# Patient Record
Sex: MALE | Race: WHITE | NOT HISPANIC OR LATINO | Employment: UNEMPLOYED | URBAN - METROPOLITAN AREA
[De-identification: names, ages, dates, MRNs, and addresses within clinical notes are randomized per-mention and may not be internally consistent; named-entity substitution may affect disease eponyms.]

---

## 2023-03-21 ENCOUNTER — LAB (OUTPATIENT)
Dept: LAB | Facility: HOSPITAL | Age: 58
End: 2023-03-21
Payer: MEDICAID

## 2023-03-21 ENCOUNTER — TELEPHONE (OUTPATIENT)
Dept: FAMILY MEDICINE CLINIC | Facility: CLINIC | Age: 58
End: 2023-03-21

## 2023-03-21 ENCOUNTER — OFFICE VISIT (OUTPATIENT)
Dept: FAMILY MEDICINE CLINIC | Facility: CLINIC | Age: 58
End: 2023-03-21
Payer: MEDICAID

## 2023-03-21 VITALS
SYSTOLIC BLOOD PRESSURE: 124 MMHG | BODY MASS INDEX: 31.04 KG/M2 | WEIGHT: 197.8 LBS | OXYGEN SATURATION: 99 % | DIASTOLIC BLOOD PRESSURE: 82 MMHG | HEIGHT: 67 IN | HEART RATE: 81 BPM

## 2023-03-21 DIAGNOSIS — I10 PRIMARY HYPERTENSION: ICD-10-CM

## 2023-03-21 DIAGNOSIS — I87.2 VENOUS INSUFFICIENCY OF BOTH LOWER EXTREMITIES: ICD-10-CM

## 2023-03-21 DIAGNOSIS — Z00.00 ENCOUNTER FOR MEDICAL EXAMINATION TO ESTABLISH CARE: ICD-10-CM

## 2023-03-21 DIAGNOSIS — E11.65 TYPE 2 DIABETES MELLITUS WITH HYPERGLYCEMIA, WITHOUT LONG-TERM CURRENT USE OF INSULIN: Primary | ICD-10-CM

## 2023-03-21 DIAGNOSIS — J45.20 MILD INTERMITTENT ASTHMA WITHOUT COMPLICATION: ICD-10-CM

## 2023-03-21 DIAGNOSIS — Z12.11 COLON CANCER SCREENING: ICD-10-CM

## 2023-03-21 DIAGNOSIS — E78.5 HYPERLIPIDEMIA LDL GOAL <70: ICD-10-CM

## 2023-03-21 DIAGNOSIS — J30.2 SEASONAL ALLERGIES: ICD-10-CM

## 2023-03-21 DIAGNOSIS — Z87.891 FORMER TOBACCO USE: ICD-10-CM

## 2023-03-21 DIAGNOSIS — Z79.82 CURRENT USE OF ASPIRIN: ICD-10-CM

## 2023-03-21 LAB
CHOLEST SERPL-MCNC: 229 MG/DL (ref 0–200)
CHOLEST/HDLC SERPL: 3.18 {RATIO}
HDLC SERPL-MCNC: 72 MG/DL (ref 40–60)
LDLC SERPL CALC-MCNC: 128 MG/DL (ref 0–100)
TRIGL SERPL-MCNC: 164 MG/DL (ref 0–150)
VLDLC SERPL CALC-MCNC: 29 MG/DL (ref 5–40)

## 2023-03-21 RX ORDER — ATORVASTATIN CALCIUM 20 MG/1
20 TABLET, FILM COATED ORAL DAILY
Qty: 90 TABLET | Refills: 3 | Status: SHIPPED | OUTPATIENT
Start: 2023-03-21 | End: 2023-03-22 | Stop reason: ALTCHOICE

## 2023-03-21 RX ORDER — LORATADINE 10 MG/1
10 TABLET ORAL DAILY
Qty: 30 TABLET | Refills: 5 | Status: SHIPPED | OUTPATIENT
Start: 2023-03-21

## 2023-03-21 RX ORDER — ASPIRIN 81 MG/1
81 TABLET ORAL DAILY
COMMUNITY
End: 2023-03-21 | Stop reason: SDUPTHER

## 2023-03-21 RX ORDER — FUROSEMIDE 20 MG/1
20 TABLET ORAL 2 TIMES DAILY
COMMUNITY
End: 2023-03-21 | Stop reason: SDUPTHER

## 2023-03-21 RX ORDER — POTASSIUM CITRATE 10 MEQ/1
10 TABLET, EXTENDED RELEASE ORAL
COMMUNITY
End: 2023-03-21 | Stop reason: SDUPTHER

## 2023-03-21 RX ORDER — ATORVASTATIN CALCIUM 20 MG/1
20 TABLET, FILM COATED ORAL DAILY
COMMUNITY
End: 2023-03-21 | Stop reason: SDUPTHER

## 2023-03-21 RX ORDER — POTASSIUM CITRATE 10 MEQ/1
10 TABLET, EXTENDED RELEASE ORAL DAILY
Qty: 30 EACH | Refills: 5 | Status: SHIPPED | OUTPATIENT
Start: 2023-03-21

## 2023-03-21 RX ORDER — ASPIRIN 81 MG/1
81 TABLET ORAL DAILY
Qty: 30 TABLET | Refills: 5 | Status: SHIPPED | OUTPATIENT
Start: 2023-03-21

## 2023-03-21 RX ORDER — LANCETS
1 EACH MISCELLANEOUS DAILY
Qty: 100 EACH | Refills: 11 | Status: SHIPPED | OUTPATIENT
Start: 2023-03-21

## 2023-03-21 RX ORDER — LISINOPRIL 10 MG/1
10 TABLET ORAL DAILY
Qty: 30 TABLET | Refills: 5 | Status: SHIPPED | OUTPATIENT
Start: 2023-03-21

## 2023-03-21 RX ORDER — BLOOD-GLUCOSE METER
1 KIT MISCELLANEOUS DAILY
Qty: 1 EACH | Refills: 0 | Status: SHIPPED | OUTPATIENT
Start: 2023-03-21

## 2023-03-21 RX ORDER — FUROSEMIDE 20 MG/1
20 TABLET ORAL DAILY PRN
Qty: 30 TABLET | Refills: 5 | Status: SHIPPED | OUTPATIENT
Start: 2023-03-21

## 2023-03-21 RX ORDER — LISINOPRIL 10 MG/1
10 TABLET ORAL DAILY
COMMUNITY
End: 2023-03-21 | Stop reason: SDUPTHER

## 2023-03-21 RX ORDER — ALBUTEROL SULFATE 90 UG/1
2 AEROSOL, METERED RESPIRATORY (INHALATION) EVERY 4 HOURS PRN
Qty: 18 G | Refills: 2 | Status: SHIPPED | OUTPATIENT
Start: 2023-03-21

## 2023-03-21 NOTE — TELEPHONE ENCOUNTER
Rx Refill Note  Requested Prescriptions     Signed Prescriptions Disp Refills   • glucose monitor monitoring kit 1 each 0     Si each Daily. Use daily as directed.     Authorizing Provider: HSANNA MANCINI     Ordering User: HAYDEE RAE   • glucose blood test strip 100 each 12     Si each by Other route Daily. Use daily as directed     Authorizing Provider: SHANNA MANCINI     Ordering User: HAYDEE RAE   • Lancets Thin misc 100 each 11     Si each Daily. Use daily as directed     Authorizing Provider: SHANNA MANCINI     Ordering User: HAYDEE RAE      Last office visit with prescribing clinician: 3/21/2023   Last telemedicine visit with prescribing clinician: 2023   Next office visit with prescribing clinician: 2023                         Would you like a call back once the refill request has been completed: [] Yes [] No    If the office needs to give you a call back, can they leave a voicemail: [] Yes [] No    Haydee Rae MA  23, 16:00 EDT

## 2023-03-21 NOTE — PROGRESS NOTES
New Patient Office Visit      Patient Name: Prosper Gonzalez  : 1965   MRN: [id number]   Care Team: Patient Care Team:  Moriah Hamilton DO as PCP - General (Internal Medicine)    Chief Complaint:    Chief Complaint   Patient presents with   • new patient preventative medicine service   • Diabetes       History of Present Illness: Prosper Gonzalez is a 57 y.o. male with HTN, T2DM, HLD who is here today to establish care. Feeling well today. No acute complaints.     T2DM - a1c  6.7% 2023. He is taking metformin 1000mg twice daily. He is on ACEi and statin.     Recently had labs including CMP, CBC, HCV screening, HIV screening which were all unrevealing other than mild hyperglycemia. Has not had cholesterol screening.    History of substance abuse in recovery (alcoholism, marijuana, meth). He has been sober for 4 years. He was recently released from group home and is living in sober living at [facility] sober living [facility]. Happy with this program. Denies cravings.     HTN - he is taking lasix 20mg daily and lisinopril 10mg daily. BP well controlled with these. He has been out of these medications for about 1 week. Lasix has been used to manage his bilateral LE edema. He also uses compression stockings        Subjective      Review of Systems:   Review of Systems - See HPI    Past Medical History:   Past Medical History:   Diagnosis Date   • Asthma    • Depression    • Diabetes mellitus (HCC)    • Hyperlipidemia    • Hypertension    • Kidney stone        Past Surgical History: History reviewed. No pertinent surgical history.    Family History: History reviewed. No pertinent family history.    Social History:   Social History     Socioeconomic History   • Marital status:    Tobacco Use   • Smoking status: Former     Packs/day: 1.00     Years: 34.00     Pack years: 34.00     Types: Cigarettes     Start date: 1973     Quit date: 2007     Years since quittin.2   • Smokeless  "tobacco: Never   Vaping Use   • Vaping Use: Never used   Substance and Sexual Activity   • Alcohol use: Not Currently   • Drug use: Not Currently   • Sexual activity: Defer       Tobacco History:   Social History     Tobacco Use   Smoking Status Former   • Packs/day: 1.00   • Years: 34.00   • Pack years: 34.00   • Types: Cigarettes   • Start date: 1973   • Quit date: 2007   • Years since quittin.2   Smokeless Tobacco Never       Medications:     Current Outpatient Medications:   •  aspirin 81 MG EC tablet, Take 1 tablet by mouth Daily., Disp: 30 tablet, Rfl: 5  •  atorvastatin (LIPITOR) 20 MG tablet, Take 1 tablet by mouth Daily., Disp: 90 tablet, Rfl: 3  •  furosemide (LASIX) 20 MG tablet, Take 1 tablet by mouth Daily As Needed (lower extremity swelling)., Disp: 30 tablet, Rfl: 5  •  lisinopril (PRINIVIL,ZESTRIL) 10 MG tablet, Take 1 tablet by mouth Daily., Disp: 30 tablet, Rfl: 5  •  metFORMIN (GLUCOPHAGE) 1000 MG tablet, Take 1 tablet by mouth 2 (Two) Times a Day With Meals., Disp: 60 tablet, Rfl: 5  •  potassium citrate (UROCIT-K) 10 MEQ (1080 MG) CR tablet, Take 1 tablet by mouth Daily. Only take when taking Furosemide (Lasix)., Disp: 30 each, Rfl: 5  •  albuterol sulfate  (90 Base) MCG/ACT inhaler, Inhale 2 puffs Every 4 (Four) Hours As Needed for Wheezing., Disp: 18 g, Rfl: 2  •  loratadine (Claritin) 10 MG tablet, Take 1 tablet by mouth Daily., Disp: 30 tablet, Rfl: 5    Allergies:   No Known Allergies    Objective     Physical Exam:  Vital Signs:   Vitals:    23 0932   BP: 124/82   Pulse: 81   SpO2: 99%   Weight: 89.7 kg (197 lb 12.8 oz)   Height: 170.2 cm (67\")     Body mass index is 30.98 kg/m².     Physical Exam  Vitals reviewed.   Constitutional:       Appearance: Normal appearance. He is not ill-appearing.   Cardiovascular:      Rate and Rhythm: Normal rate and regular rhythm.      Heart sounds: Normal heart sounds.      Comments: No LE edema, wearing compression stockings " today  Pulmonary:      Effort: Pulmonary effort is normal. No respiratory distress.      Breath sounds: Normal breath sounds. No wheezing.   Skin:     General: Skin is warm and dry.   Neurological:      Mental Status: He is alert.   Psychiatric:         Mood and Affect: Mood normal.         Behavior: Behavior normal.         Judgment: Judgment normal.         Assessment / Plan      Assessment/Plan:   Problems Addressed This Visit  Diagnoses and all orders for this visit:    1. Type 2 diabetes mellitus with hyperglycemia, without long-term current use of insulin (HCC) (Primary)  -     Lipid Panel w/ Chol/HDL Ratio; Future  -     atorvastatin (LIPITOR) 20 MG tablet; Take 1 tablet by mouth Daily.  Dispense: 90 tablet; Refill: 3  -     metFORMIN (GLUCOPHAGE) 1000 MG tablet; Take 1 tablet by mouth 2 (Two) Times a Day With Meals.  Dispense: 60 tablet; Refill: 5  -     Lipid Panel w/ Chol/HDL Ratio    A1c  6.7% 2/2023  Continue Metformin 1000mg BID  Continue dietary modification and active lifestyle  Continue ACEi and statin  Due for eye exam, urine microalbumin, foot exam. Will discuss at next visit     2. Primary hypertension  -     potassium citrate (UROCIT-K) 10 MEQ (1080 MG) CR tablet; Take 1 tablet by mouth Daily. Only take when taking Furosemide (Lasix).  Dispense: 30 each; Refill: 5  -     lisinopril (PRINIVIL,ZESTRIL) 10 MG tablet; Take 1 tablet by mouth Daily.  Dispense: 30 tablet; Refill: 5  -     furosemide (LASIX) 20 MG tablet; Take 1 tablet by mouth Daily As Needed (lower extremity swelling).  Dispense: 30 tablet; Refill: 5  At goal today  Continue lisinopril 10mg daily     3. Hyperlipidemia LDL goal <70  -     Lipid Panel w/ Chol/HDL Ratio; Future  -     atorvastatin (LIPITOR) 20 MG tablet; Take 1 tablet by mouth Daily.  Dispense: 90 tablet; Refill: 3  -     Lipid Panel w/ Chol/HDL Ratio    4. Encounter for medical examination to establish care  Discussed importance of preventative care including  vaccinations, age appropriate cancer screening, routine lab work, healthy diet, and active lifestyle.  Due for colonoscopy - ordered today  Routine labs ordered today    5. Mild intermittent asthma without complication  -     albuterol sulfate  (90 Base) MCG/ACT inhaler; Inhale 2 puffs Every 4 (Four) Hours As Needed for Wheezing.  Dispense: 18 g; Refill: 2    6. Former tobacco use  Assessment & Plan:  34 pack years, quit >15 years ago      7. Venous insufficiency of both lower extremities  -     potassium citrate (UROCIT-K) 10 MEQ (1080 MG) CR tablet; Take 1 tablet by mouth Daily. Only take when taking Furosemide (Lasix).  Dispense: 30 each; Refill: 5  -     furosemide (LASIX) 20 MG tablet; Take 1 tablet by mouth Daily As Needed (lower extremity swelling).  Dispense: 30 tablet; Refill: 5  Discussed importance of low sodium diet, compression stockings and leg elevation. Ok to continue using lasix 20mg but discussed importance of using this only on an as needed basis rather than every day     8. Current use of aspirin  -     aspirin 81 MG EC tablet; Take 1 tablet by mouth Daily.  Dispense: 30 tablet; Refill: 5  Patient reports previous provider started this for primary ppx     9. Seasonal allergies  -     loratadine (Claritin) 10 MG tablet; Take 1 tablet by mouth Daily.  Dispense: 30 tablet; Refill: 5    10. Colon cancer screening  -     Ambulatory Referral For Screening Colonoscopy        Plan of care reviewed with patient at the conclusion of today's visit. Education was provided regarding diagnosis and management.  Patient verbalizes understanding of and agreement with management plan.      Follow Up:   Return in about 3 months (around 6/21/2023) for Recheck T2DM, HTN .          DO CAM AldridgeE [address]  [facility]  [address]  [facility] [address]  Fax [phone]  Phone [phone]

## 2023-03-21 NOTE — TELEPHONE ENCOUNTER
Caller: Prosper Gonzalez    Relationship: Self    Best call back number: [phone]    What medication are you requesting: ACCU CHEK     What are your current symptoms: WANTING TO CHECK BLOOD GLUCOSE LEVELS AT HOME    If a prescription is needed, what is your preferred pharmacy and phone number: [facility]  [place], [place] - [address]  - [phone]  - [phone] FX     Additional notes: PATIENT STATES THAT HE MENTIONED NEEDING A NEW ACCU CHEK GLUCOSE MONITOR, TEST STRIPS AND LANCETS TO THE MA WHO CHECKED HIS VITALS AT HIS APPOINTMENT DR MANCINI TODAY 3/21/23, HOWEVER HE FORGOT TO MENTION THIS NEED WITH DR MANCINI.    PLEASE ADVISE IF THERE ARE ANY QUESTIONS/CONCERNS FOR THE PATIENT REGARDING THIS OR TO NOTIFY WHEN THESE PRESCRIPTIONS HAVE BEEN SENT TO THE PHARMACY.

## 2023-03-22 DIAGNOSIS — I10 PRIMARY HYPERTENSION: Primary | ICD-10-CM

## 2023-03-22 RX ORDER — ATORVASTATIN CALCIUM 40 MG/1
40 TABLET, FILM COATED ORAL DAILY
Qty: 90 TABLET | Refills: 1 | Status: SHIPPED | OUTPATIENT
Start: 2023-03-22

## 2023-05-16 DIAGNOSIS — Z79.82 CURRENT USE OF ASPIRIN: ICD-10-CM

## 2023-05-16 RX ORDER — ASPIRIN 81 MG/1
81 TABLET ORAL DAILY
Qty: 30 TABLET | Refills: 5 | OUTPATIENT
Start: 2023-05-16

## 2023-05-16 NOTE — TELEPHONE ENCOUNTER
Caller: Prosper Gonzalez    Relationship: Self    Best call back number: [phone]    Requested Prescriptions:    Requested Prescriptions     Pending Prescriptions Disp Refills   • aspirin 81 MG EC tablet 30 tablet 5     Sig: Take 1 tablet by mouth Daily.         Pharmacy where request should be sent: [facility] [place], [place] - [address]  - [phone]  - [phone] FX     Last office visit with prescribing clinician: 3/21/2023   Last telemedicine visit with prescribing clinician: 3/22/2023   Next office visit with prescribing clinician: 6/21/2023     Additional details provided by patient: PATIENT IS COMPLETELY OUT OF THE ASPIRIN.    Does the patient have less than a 3 day supply:  [x] Yes  [] No    Would you like a call back once the refill request has been completed: [] Yes [] No    If the office needs to give you a call back, can they leave a voicemail: [] Yes [] No    Юлия Dimas Rep   05/16/23 08:49 EDT

## 2023-06-01 ENCOUNTER — TELEPHONE (OUTPATIENT)
Dept: FAMILY MEDICINE CLINIC | Facility: CLINIC | Age: 58
End: 2023-06-01

## 2023-06-01 DIAGNOSIS — E11.65 TYPE 2 DIABETES MELLITUS WITH HYPERGLYCEMIA, WITHOUT LONG-TERM CURRENT USE OF INSULIN: ICD-10-CM

## 2023-06-01 DIAGNOSIS — I10 PRIMARY HYPERTENSION: ICD-10-CM

## 2023-06-01 RX ORDER — LISINOPRIL 10 MG/1
10 TABLET ORAL DAILY
Qty: 30 TABLET | Refills: 5 | Status: SHIPPED | OUTPATIENT
Start: 2023-06-01

## 2023-06-01 NOTE — TELEPHONE ENCOUNTER
Caller: Prosper Gonzalez    Relationship: Self    Best call back number: [phone]    Requested Prescriptions:   Requested Prescriptions     Pending Prescriptions Disp Refills   • metFORMIN (GLUCOPHAGE) 1000 MG tablet 60 tablet 5     Sig: Take 1 tablet by mouth 2 (Two) Times a Day With Meals.   • lisinopril (PRINIVIL,ZESTRIL) 10 MG tablet 30 tablet 5     Sig: Take 1 tablet by mouth Daily.        Pharmacy where request should be sent: [address] - [phone]  - [phone] FX     Last office visit with prescribing clinician: 3/21/2023   Last telemedicine visit with prescribing clinician: Visit date not found   Next office visit with prescribing clinician: 6/21/2023     Additional details provided by patient: PATIENT IS COMPLETELY OUT OF BOTH THESE MEDICATIONS.     Does the patient have less than a 3 day supply:  [x] Yes  [] No    Would you like a call back once the refill request has been completed: [] Yes [] No    If the office needs to give you a call back, can they leave a voicemail: [] Yes [] No    Юлия Dimas Rep   06/01/23 09:09 EDT

## 2023-06-01 NOTE — TELEPHONE ENCOUNTER
Contacted patient to discuss, he was unable to talk currently- he will call our office back once available.
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
## 2023-06-03 ENCOUNTER — OFFICE VISIT (OUTPATIENT)
Dept: FAMILY MEDICINE CLINIC | Facility: CLINIC | Age: 58
End: 2023-06-03

## 2023-06-03 VITALS
WEIGHT: 183 LBS | BODY MASS INDEX: 28.72 KG/M2 | HEART RATE: 76 BPM | HEIGHT: 67 IN | SYSTOLIC BLOOD PRESSURE: 118 MMHG | DIASTOLIC BLOOD PRESSURE: 80 MMHG | OXYGEN SATURATION: 98 %

## 2023-06-03 DIAGNOSIS — E11.65 TYPE 2 DIABETES MELLITUS WITH HYPERGLYCEMIA, WITHOUT LONG-TERM CURRENT USE OF INSULIN: ICD-10-CM

## 2023-06-03 DIAGNOSIS — Z01.00 EYE EXAM, ROUTINE: ICD-10-CM

## 2023-06-03 DIAGNOSIS — H57.89 EYE IRRITATION: ICD-10-CM

## 2023-06-03 DIAGNOSIS — H10.13 ALLERGIC CONJUNCTIVITIS OF BOTH EYES: Primary | ICD-10-CM

## 2023-06-03 RX ORDER — OLOPATADINE HYDROCHLORIDE 2 MG/ML
1 SOLUTION/ DROPS OPHTHALMIC DAILY
Qty: 2.5 ML | Refills: 0 | Status: SHIPPED | OUTPATIENT
Start: 2023-06-03
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
## 2023-07-24 ENCOUNTER — OFFICE VISIT (OUTPATIENT)
Dept: FAMILY MEDICINE CLINIC | Facility: CLINIC | Age: 58
End: 2023-07-24
Payer: COMMERCIAL

## 2023-07-24 ENCOUNTER — HOSPITAL ENCOUNTER (OUTPATIENT)
Dept: GENERAL RADIOLOGY | Facility: HOSPITAL | Age: 58
Discharge: HOME | End: 2023-07-24
Admitting: STUDENT IN AN ORGANIZED HEALTH CARE EDUCATION/TRAINING PROGRAM
Payer: COMMERCIAL

## 2023-07-24 VITALS
SYSTOLIC BLOOD PRESSURE: 126 MMHG | OXYGEN SATURATION: 99 % | HEART RATE: 100 BPM | HEIGHT: 67 IN | BODY MASS INDEX: 29.03 KG/M2 | DIASTOLIC BLOOD PRESSURE: 80 MMHG | WEIGHT: 185 LBS

## 2023-07-24 DIAGNOSIS — K40.90 LEFT INGUINAL HERNIA: ICD-10-CM

## 2023-07-24 DIAGNOSIS — K40.21 BILATERAL RECURRENT INGUINAL HERNIA WITHOUT OBSTRUCTION OR GANGRENE: Primary | ICD-10-CM

## 2023-07-24 DIAGNOSIS — Z98.890 HISTORY OF ANKLE SURGERY: ICD-10-CM

## 2023-07-24 DIAGNOSIS — G89.29 CHRONIC PAIN OF RIGHT ANKLE: ICD-10-CM

## 2023-07-24 DIAGNOSIS — M25.571 CHRONIC PAIN OF RIGHT ANKLE: ICD-10-CM

## 2023-07-24 PROCEDURE — 99214 OFFICE O/P EST MOD 30 MIN: CPT | Performed by: STUDENT IN AN ORGANIZED HEALTH CARE EDUCATION/TRAINING PROGRAM

## 2023-07-24 PROCEDURE — 3079F DIAST BP 80-89 MM HG: CPT | Performed by: STUDENT IN AN ORGANIZED HEALTH CARE EDUCATION/TRAINING PROGRAM

## 2023-07-24 PROCEDURE — 3074F SYST BP LT 130 MM HG: CPT | Performed by: STUDENT IN AN ORGANIZED HEALTH CARE EDUCATION/TRAINING PROGRAM

## 2023-07-24 PROCEDURE — 73610 X-RAY EXAM OF ANKLE: CPT

## 2023-07-24 RX ORDER — NALTREXONE 380 MG
KIT INTRAMUSCULAR
COMMUNITY
Start: 2023-07-03

## 2023-07-28 ENCOUNTER — TELEPHONE (OUTPATIENT)
Dept: FAMILY MEDICINE CLINIC | Facility: CLINIC | Age: 58
End: 2023-07-28
Payer: COMMERCIAL

## 2023-07-28 DIAGNOSIS — E11.65 TYPE 2 DIABETES MELLITUS WITH HYPERGLYCEMIA, WITHOUT LONG-TERM CURRENT USE OF INSULIN: ICD-10-CM

## 2023-07-28 DIAGNOSIS — I10 PRIMARY HYPERTENSION: ICD-10-CM

## 2023-07-28 DIAGNOSIS — Z79.82 CURRENT USE OF ASPIRIN: ICD-10-CM

## 2023-07-28 DIAGNOSIS — I87.2 VENOUS INSUFFICIENCY OF BOTH LOWER EXTREMITIES: ICD-10-CM

## 2023-07-28 RX ORDER — FUROSEMIDE 20 MG/1
20 TABLET ORAL DAILY PRN
Qty: 30 TABLET | Refills: 3 | Status: SHIPPED | OUTPATIENT
Start: 2023-07-28

## 2023-07-28 RX ORDER — POTASSIUM CITRATE 10 MEQ/1
10 TABLET, EXTENDED RELEASE ORAL DAILY
Qty: 30 EACH | Refills: 3 | Status: SHIPPED | OUTPATIENT
Start: 2023-07-28

## 2023-07-28 RX ORDER — LISINOPRIL 10 MG/1
10 TABLET ORAL DAILY
Qty: 30 TABLET | Refills: 5 | OUTPATIENT
Start: 2023-07-28

## 2023-07-28 RX ORDER — ASPIRIN 81 MG/1
81 TABLET ORAL DAILY
Qty: 30 TABLET | Refills: 3 | Status: SHIPPED | OUTPATIENT
Start: 2023-07-28

## 2023-07-28 NOTE — TELEPHONE ENCOUNTER
Caller: Carlos Prosper DANE    Relationship: Self    Best call back number: [phone]     Requested Prescriptions:   Requested Prescriptions     Pending Prescriptions Disp Refills    metFORMIN (GLUCOPHAGE) 1000 MG tablet 60 tablet 5     Sig: Take 1 tablet by mouth 2 (Two) Times a Day With Meals.    aspirin 81 MG EC tablet 30 tablet 5     Sig: Take 1 tablet by mouth Daily.    lisinopril (PRINIVIL,ZESTRIL) 10 MG tablet 30 tablet 5     Sig: Take 1 tablet by mouth Daily.    furosemide (LASIX) 20 MG tablet 30 tablet 5     Sig: Take 1 tablet by mouth Daily As Needed (lower extremity swelling).    potassium citrate (UROCIT-K) 10 MEQ (1080 MG) CR tablet 30 each 5     Sig: Take 1 tablet by mouth Daily. Only take when taking Furosemide (Lasix).      Pharmacy where request should be sent: [facility] [place], [place] - [address] [place] [address] LN - [phone]  - [phone] FX     Last office visit with prescribing clinician: 7/24/2023   Last telemedicine visit with prescribing clinician: Visit date not found   Next office visit with prescribing clinician: 9/5/2023     Additional details provided by patient: PATIENT HAS 4 DAYS LEFT    Does the patient have less than a 3 day supply:  [] Yes  [x] No    Would you like a call back once the refill request has been completed: [x] Yes [] No    If the office needs to give you a call back, can they leave a voicemail: [x] Yes [] No    Юлия Dimas   07/28/23 15:20 EDT

## 2023-07-28 NOTE — TELEPHONE ENCOUNTER
Caller: Prosper Gonzalez    Relationship: Self    Best call back number: [phone]     What is the best time to reach you: ANY    Who are you requesting to speak with (clinical staff, provider,  specific staff member): DR. MANCINI OR HER NURSE     What was the call regarding: THE PATIENT SAID THAT HER SPOKE TO DR. MANCINI AND WAS TOLD THAT FOR HIS HERNIA THAT HE WAS GOING TO BE SENT SOMEWHERE. HAS THIS BEEN DONE? PLEASE LET HIM KNOW.     Is it okay if the provider responds through [employer]: NO   follow up on this request.”

## 2023-07-28 NOTE — TELEPHONE ENCOUNTER
Rx Refill Note  Requested Prescriptions     Pending Prescriptions Disp Refills    metFORMIN (GLUCOPHAGE) 1000 MG tablet 60 tablet 5     Sig: Take 1 tablet by mouth 2 (Two) Times a Day With Meals.    aspirin 81 MG EC tablet 30 tablet 5     Sig: Take 1 tablet by mouth Daily.    lisinopril (PRINIVIL,ZESTRIL) 10 MG tablet 30 tablet 5     Sig: Take 1 tablet by mouth Daily.    furosemide (LASIX) 20 MG tablet 30 tablet 5     Sig: Take 1 tablet by mouth Daily As Needed (lower extremity swelling).    potassium citrate (UROCIT-K) 10 MEQ (1080 MG) CR tablet 30 each 5     Sig: Take 1 tablet by mouth Daily. Only take when taking Furosemide (Lasix).      Last office visit with prescribing clinician: 7/24/2023   Last telemedicine visit with prescribing clinician: Visit date not found   Next office visit with prescribing clinician:                          Would you like a call back once the refill request has been completed: [] Yes [] No    If the office needs to give you a call back, can they leave a voicemail: [] Yes [] No    April COLTEN Castellanos  07/28/23, 15:25 EDT

## 2023-07-31 NOTE — TELEPHONE ENCOUNTER
Called patient but was unable to leave a voicemail as he has calling restrictions enabled. This message was sent to his myChart:    I tried calling your cell, but calling restrictions were enabled and I was unable to get through.   Your consultation has been scheduled for August 22nd at 9:45am. Please arrive to [facility] at [address], [place] [address] by 9:15am for new patient paperwork. If you need to reschedule, you can call their office at [phone].    Hub can relay and document.

## 2023-08-15 DIAGNOSIS — I87.2 VENOUS INSUFFICIENCY OF BOTH LOWER EXTREMITIES: ICD-10-CM

## 2023-08-15 DIAGNOSIS — E11.65 TYPE 2 DIABETES MELLITUS WITH HYPERGLYCEMIA, WITHOUT LONG-TERM CURRENT USE OF INSULIN: ICD-10-CM

## 2023-08-15 DIAGNOSIS — Z79.82 CURRENT USE OF ASPIRIN: ICD-10-CM

## 2023-08-15 DIAGNOSIS — I10 PRIMARY HYPERTENSION: ICD-10-CM

## 2023-08-15 RX ORDER — ATORVASTATIN CALCIUM 40 MG/1
40 TABLET, FILM COATED ORAL DAILY
Qty: 90 TABLET | Refills: 1 | Status: SHIPPED | OUTPATIENT
Start: 2023-08-15

## 2023-08-15 RX ORDER — LISINOPRIL 10 MG/1
10 TABLET ORAL DAILY
Qty: 30 TABLET | Refills: 5 | Status: SHIPPED | OUTPATIENT
Start: 2023-08-15

## 2023-08-15 RX ORDER — POTASSIUM CITRATE 10 MEQ/1
10 TABLET, EXTENDED RELEASE ORAL DAILY
Qty: 30 EACH | Refills: 3 | OUTPATIENT
Start: 2023-08-15

## 2023-08-15 RX ORDER — FUROSEMIDE 20 MG/1
20 TABLET ORAL DAILY PRN
Qty: 30 TABLET | Refills: 3 | OUTPATIENT
Start: 2023-08-15

## 2023-08-15 RX ORDER — ASPIRIN 81 MG/1
81 TABLET ORAL DAILY
Qty: 30 TABLET | Refills: 3 | OUTPATIENT
Start: 2023-08-15

## 2023-08-15 NOTE — TELEPHONE ENCOUNTER
Caller: Prosper Gonzalez    Relationship: Self    Best call back number: [phone]     Requested Prescriptions:   Requested Prescriptions     Pending Prescriptions Disp Refills    atorvastatin (Lipitor) 40 MG tablet 90 tablet 1     Sig: Take 1 tablet by mouth Daily.    lisinopril (PRINIVIL,ZESTRIL) 10 MG tablet 30 tablet 5     Sig: Take 1 tablet by mouth Daily.    furosemide (LASIX) 20 MG tablet 30 tablet 3     Sig: Take 1 tablet by mouth Daily As Needed (lower extremity swelling).    potassium citrate (UROCIT-K) 10 MEQ (1080 MG) CR tablet 30 each 3     Sig: Take 1 tablet by mouth Daily. Only take when taking Furosemide (Lasix).    metFORMIN (GLUCOPHAGE) 1000 MG tablet 60 tablet 5     Sig: Take 1 tablet by mouth 2 (Two) Times a Day With Meals.    aspirin 81 MG EC tablet 30 tablet 3     Sig: Take 1 tablet by mouth Daily.        Pharmacy where request should be sent: [address] - [phone]  - [phone] FX     Last office visit with prescribing clinician: 7/24/2023   Last telemedicine visit with prescribing clinician: Visit date not found   Next office visit with prescribing clinician: 9/5/2023     Additional details provided by patient: OUT OF ALL MEDICATIONS     Does the patient have less than a 3 day supply:  [x] Yes  [] No    Would you like a call back once the refill request has been completed: [] Yes [x] No    If the office needs to give you a call back, can they leave a voicemail: [] Yes [x] No    Юлия Yung   08/15/23 13:40 EDT

## 2023-09-05 ENCOUNTER — OFFICE VISIT (OUTPATIENT)
Dept: FAMILY MEDICINE CLINIC | Facility: CLINIC | Age: 58
End: 2023-09-05
Payer: COMMERCIAL

## 2023-09-05 VITALS
SYSTOLIC BLOOD PRESSURE: 122 MMHG | HEIGHT: 67 IN | WEIGHT: 186 LBS | HEART RATE: 82 BPM | OXYGEN SATURATION: 98 % | DIASTOLIC BLOOD PRESSURE: 80 MMHG | BODY MASS INDEX: 29.19 KG/M2

## 2023-09-05 DIAGNOSIS — Z23 IMMUNIZATION DUE: ICD-10-CM

## 2023-09-05 DIAGNOSIS — I10 PRIMARY HYPERTENSION: ICD-10-CM

## 2023-09-05 DIAGNOSIS — E78.5 HYPERLIPIDEMIA LDL GOAL <70: ICD-10-CM

## 2023-09-05 DIAGNOSIS — E11.65 TYPE 2 DIABETES MELLITUS WITH HYPERGLYCEMIA, WITHOUT LONG-TERM CURRENT USE OF INSULIN: Primary | ICD-10-CM

## 2023-09-05 DIAGNOSIS — L21.9 SEBORRHEIC DERMATITIS: ICD-10-CM

## 2023-09-05 LAB
EXPIRATION DATE: NORMAL
EXPIRATION DATE: NORMAL
HBA1C MFR BLD: 7.3 %
Lab: NORMAL
Lab: NORMAL
POC CREATININE URINE: 200
POC MICROALBUMIN URINE: 80

## 2023-09-05 RX ORDER — KETOCONAZOLE 20 MG/ML
SHAMPOO TOPICAL 2 TIMES WEEKLY
Qty: 120 ML | Refills: 0 | Status: SHIPPED | OUTPATIENT
Start: 2023-09-07

## 2023-09-05 NOTE — PROGRESS NOTES
Established Office Visit      Patient Name: Prosper Gonzalez  : 1965   MRN: [id number]   Care Team: Patient Care Team:  Moriah Calderon DO as PCP - General (Internal Medicine)    Chief Complaint:    Chief Complaint   Patient presents with    Hypertension     3 month f/u     Hyperlipidemia    Type 2 diabetes mellitus with hyperglycemia       History of Present Illness: Prosper Gonzalez is a 58 y.o. male with HTN, T2DM, HLD who is here today to follow up with chronic medical problems.     A1c slightly worse today at 7.3%, previously 6.7%.   He admits to a few missed doses of metformin but overall tries to be compliant.  He admits to poor dietary habits lately and soda. He reports knowing where/what to cut back on.     He recently started his atorvastatin 40mg daily (increased from 20mg daily) but has only been taking this for 2-3 weeks.     He complains of itchy rash on her eyebrow and mustache. Describes as dry and flaky.     Subjective      Review of Systems:   Review of Systems - See HPI    I have reviewed and the following portions of the patient's history were updated as appropriate: past family history, past medical history, past social history, past surgical history and problem list.    Medications:     Current Outpatient Medications:     albuterol sulfate  (90 Base) MCG/ACT inhaler, Inhale 2 puffs Every 4 (Four) Hours As Needed for Wheezing., Disp: 18 g, Rfl: 2    aspirin 81 MG EC tablet, Take 1 tablet by mouth Daily., Disp: 30 tablet, Rfl: 3    atorvastatin (Lipitor) 40 MG tablet, Take 1 tablet by mouth Daily., Disp: 90 tablet, Rfl: 1    furosemide (LASIX) 20 MG tablet, Take 1 tablet by mouth Daily As Needed (lower extremity swelling)., Disp: 30 tablet, Rfl: 3    glucose blood test strip, 1 each by Other route Daily. Use daily as directed, Disp: 100 each, Rfl: 12    glucose monitor monitoring kit, 1 each Daily. Use daily as directed., Disp: 1 each, Rfl: 0    Lancets Thin misc, 1 each Daily. Use  "daily as directed, Disp: 100 each, Rfl: 11    lisinopril (PRINIVIL,ZESTRIL) 10 MG tablet, Take 1 tablet by mouth Daily., Disp: 30 tablet, Rfl: 5    loratadine (Claritin) 10 MG tablet, Take 1 tablet by mouth Daily., Disp: 30 tablet, Rfl: 5    metFORMIN (GLUCOPHAGE) 1000 MG tablet, Take 1 tablet by mouth 2 (Two) Times a Day With Meals., Disp: 60 tablet, Rfl: 5    olopatadine (PATADAY) 0.2 % solution ophthalmic solution, Administer 1 drop to both eyes Daily., Disp: 2.5 mL, Rfl: 0    potassium citrate (UROCIT-K) 10 MEQ (1080 MG) CR tablet, Take 1 tablet by mouth Daily. Only take when taking Furosemide (Lasix)., Disp: 30 each, Rfl: 3    Sod Picosulfate-Mag Ox-Cit Acd 10-3.5-12 MG-GM -GM/160ML solution, Take 320 mL by mouth Take As Directed. Please follow instructions that were mailed to your home. If you did not receive these instructions please call our office at [phone]., Disp: 320 mL, Rfl: 0    [START ON 9/7/2023] ketoconazole (NIZORAL) 2 % shampoo, Apply  topically to the appropriate area as directed 2 (Two) Times a Week., Disp: 120 mL, Rfl: 0    Allergies:   No Known Allergies    Objective     Physical Exam:  Vital Signs:   Vitals:    09/05/23 0824   BP: 122/80   Pulse: 82   SpO2: 98%   Weight: 84.4 kg (186 lb)   Height: 170.2 cm (67\")     Body mass index is 29.13 kg/m².     Physical Exam  Vitals reviewed.   Constitutional:       Appearance: Normal appearance.   Cardiovascular:      Rate and Rhythm: Normal rate.   Pulmonary:      Effort: Pulmonary effort is normal. No respiratory distress.   Skin:     General: Skin is warm and dry.      Comments: Dry, flaky skin surrounding left eye brow and mustache   Neurological:      Mental Status: He is alert.   Psychiatric:         Mood and Affect: Mood normal.         Behavior: Behavior normal.         Judgment: Judgment normal.       Assessment / Plan      Assessment/Plan:   Problems Addressed This Visit  Diagnoses and all orders for this visit:    1. Type 2 diabetes " mellitus with hyperglycemia, without long-term current use of insulin (Primary)  -     POC Glycosylated Hemoglobin (Hb A1C)  -     POCT microalbumin  -     Pneumococcal Conjugate Vaccine 20-Valent (PCV20)    A1c increased from 6.7% to 7.3% - likely due to dietary noncompliance  Continue Metformin 1000mg BID  Continue dietary modification and active lifestyle  Continue ACEi and statin  Urine micro today - continue ACEi   Eye exam 7/2023 - records requested from [facility]    2. Immunization due  -     Pneumococcal Conjugate Vaccine 20-Valent (PCV20)    3. Primary hypertension  Well controlled, continue current lisinopirl 10mg daily     4. Hyperlipidemia LDL goal <70  Has been taking Atorvastatin 40mg daily only for 2-3 weeks, will plan to repeat at next visit. Goal LDL <70    5. Seborrheic dermatitis  -     ketoconazole (NIZORAL) 2 % shampoo; Apply  topically to the appropriate area as directed 2 (Two) Times a Week.  Dispense: 120 mL; Refill: 0      Plan of care reviewed with patient at the conclusion of today's visit. Education was provided regarding diagnosis and management.  Patient verbalizes understanding of and agreement with management plan.    Follow Up:   Return in about 4 months (around 1/5/2024) for Annual.        DO DENVER Dickens RD  [facility]  [address]  [facility] [address]  Fax [phone]  Phone [phone]

## 2023-09-21 DIAGNOSIS — E11.65 TYPE 2 DIABETES MELLITUS WITH HYPERGLYCEMIA, WITHOUT LONG-TERM CURRENT USE OF INSULIN: ICD-10-CM

## 2023-09-21 DIAGNOSIS — I87.2 VENOUS INSUFFICIENCY OF BOTH LOWER EXTREMITIES: ICD-10-CM

## 2023-09-21 DIAGNOSIS — Z79.82 CURRENT USE OF ASPIRIN: ICD-10-CM

## 2023-09-21 DIAGNOSIS — J30.2 SEASONAL ALLERGIES: ICD-10-CM

## 2023-09-21 DIAGNOSIS — I10 PRIMARY HYPERTENSION: ICD-10-CM

## 2023-09-21 RX ORDER — LORATADINE 10 MG/1
10 TABLET ORAL DAILY
Qty: 30 TABLET | Refills: 5 | Status: SHIPPED | OUTPATIENT
Start: 2023-09-21

## 2023-09-21 RX ORDER — ASPIRIN 81 MG/1
81 TABLET ORAL DAILY
Qty: 30 TABLET | Refills: 3 | Status: SHIPPED | OUTPATIENT
Start: 2023-09-21

## 2023-09-21 RX ORDER — POTASSIUM CITRATE 10 MEQ/1
10 TABLET, EXTENDED RELEASE ORAL DAILY
Qty: 30 EACH | Refills: 3 | Status: SHIPPED | OUTPATIENT
Start: 2023-09-21

## 2023-09-21 RX ORDER — FUROSEMIDE 20 MG/1
20 TABLET ORAL DAILY PRN
Qty: 30 TABLET | Refills: 3 | Status: SHIPPED | OUTPATIENT
Start: 2023-09-21

## 2023-09-21 NOTE — TELEPHONE ENCOUNTER
Rx Refill Note  Requested Prescriptions     Pending Prescriptions Disp Refills    metFORMIN (GLUCOPHAGE) 1000 MG tablet 60 tablet 5     Sig: Take 1 tablet by mouth 2 (Two) Times a Day With Meals.    loratadine (Claritin) 10 MG tablet 30 tablet 5     Sig: Take 1 tablet by mouth Daily.    aspirin 81 MG EC tablet 30 tablet 3     Sig: Take 1 tablet by mouth Daily.    furosemide (LASIX) 20 MG tablet 30 tablet 3     Sig: Take 1 tablet by mouth Daily As Needed (lower extremity swelling).    potassium citrate (UROCIT-K) 10 MEQ (1080 MG) CR tablet 30 each 3     Sig: Take 1 tablet by mouth Daily. Only take when taking Furosemide (Lasix).      Last office visit with prescribing clinician: 9/5/2023   Last telemedicine visit with prescribing clinician: Visit date not found   Next office visit with prescribing clinician: 1/9/2024                         Would you like a call back once the refill request has been completed: [] Yes [] No    If the office needs to give you a call back, can they leave a voicemail: [] Yes [] No    Alice Murray MA  09/21/23, 08:44 EDT

## 2023-09-21 NOTE — TELEPHONE ENCOUNTER
Caller: Prosper Gonzalez DANE    Relationship: Self    Best call back number:     Requested Prescriptions:   Requested Prescriptions     Pending Prescriptions Disp Refills    metFORMIN (GLUCOPHAGE) 1000 MG tablet 60 tablet 5     Sig: Take 1 tablet by mouth 2 (Two) Times a Day With Meals.    loratadine (Claritin) 10 MG tablet 30 tablet 5     Sig: Take 1 tablet by mouth Daily.    aspirin 81 MG EC tablet 30 tablet 3     Sig: Take 1 tablet by mouth Daily.    furosemide (LASIX) 20 MG tablet 30 tablet 3     Sig: Take 1 tablet by mouth Daily As Needed (lower extremity swelling).    potassium citrate (UROCIT-K) 10 MEQ (1080 MG) CR tablet 30 each 3     Sig: Take 1 tablet by mouth Daily. Only take when taking Furosemide (Lasix).        Pharmacy where request should be sent: [address] - [phone]  - [phone]     Last office visit with prescribing clinician: 9/5/2023   Last telemedicine visit with prescribing clinician: Visit date not found   Next office visit with prescribing clinician: 1/9/2024     Additional details provided by patient: THE PATIENT HAS 2 MORE DAYS LEFT    Does the patient have less than a 3 day supply:  [] Yes  [x] No    Would you like a call back once the refill request has been completed: [] Yes [x] No    If the office needs to give you a call back, can they leave a voicemail: [] Yes [x] No    Юлия Amaro Rep   09/21/23 08:17 EDT

## 2023-10-13 DIAGNOSIS — I10 PRIMARY HYPERTENSION: ICD-10-CM

## 2023-10-13 RX ORDER — LISINOPRIL 10 MG/1
10 TABLET ORAL DAILY
Qty: 30 TABLET | Refills: 5 | Status: SHIPPED | OUTPATIENT
Start: 2023-10-13

## 2023-10-13 NOTE — TELEPHONE ENCOUNTER
Caller: Prosper Gonzalez DANE    Relationship: Self    Best call back number:       [phone] (Mobile)     Requested Prescriptions:   Requested Prescriptions     Pending Prescriptions Disp Refills    lisinopril (PRINIVIL,ZESTRIL) 10 MG tablet 30 tablet 5     Sig: Take 1 tablet by mouth Daily.      Pharmacy where request should be sent:     [facility] [place], [place] - [address] - [phone] PH - [phone] FX     Last office visit with prescribing clinician: 9/5/2023   Last telemedicine visit with prescribing clinician: Visit date not found   Next office visit with prescribing clinician: 1/9/2024     Additional details provided by patient:     PATIENT STATED HE HAS BEEN OUT OF THE MEDICATION FOR APPROXIMATELY (3) DAYS    Does the patient have less than a 3 day supply:  [x] Yes  [] No    Would you like a call back once the refill request has been completed: [] Yes [] No    If the office needs to give you a call back, can they leave a voicemail: [] Yes [] No    Юлия Flowers Rep   10/13/23 13:07 EDT     DR MONTANO

## 2023-10-31 DIAGNOSIS — I10 PRIMARY HYPERTENSION: ICD-10-CM

## 2023-10-31 DIAGNOSIS — I87.2 VENOUS INSUFFICIENCY OF BOTH LOWER EXTREMITIES: ICD-10-CM

## 2023-10-31 DIAGNOSIS — J30.2 SEASONAL ALLERGIES: ICD-10-CM

## 2023-10-31 DIAGNOSIS — E11.65 TYPE 2 DIABETES MELLITUS WITH HYPERGLYCEMIA, WITHOUT LONG-TERM CURRENT USE OF INSULIN: ICD-10-CM

## 2023-10-31 DIAGNOSIS — Z79.82 CURRENT USE OF ASPIRIN: ICD-10-CM

## 2023-10-31 NOTE — TELEPHONE ENCOUNTER
Caller: Prosper Gonzalez    Relationship: Self    Best call back number: [phone]     Requested Prescriptions:   Requested Prescriptions     Pending Prescriptions Disp Refills    metFORMIN (GLUCOPHAGE) 1000 MG tablet 60 tablet 5     Sig: Take 1 tablet by mouth 2 (Two) Times a Day With Meals.    potassium citrate (UROCIT-K) 10 MEQ (1080 MG) CR tablet 30 each 3     Sig: Take 1 tablet by mouth Daily. Only take when taking Furosemide (Lasix).    atorvastatin (Lipitor) 40 MG tablet 90 tablet 1     Sig: Take 1 tablet by mouth Daily.    aspirin 81 MG EC tablet 30 tablet 3     Sig: Take 1 tablet by mouth Daily.    loratadine (Claritin) 10 MG tablet 30 tablet 5     Sig: Take 1 tablet by mouth Daily.    furosemide (LASIX) 20 MG tablet 30 tablet 3     Sig: Take 1 tablet by mouth Daily As Needed (lower extremity swelling).    lisinopril (PRINIVIL,ZESTRIL) 10 MG tablet 30 tablet 5     Sig: Take 1 tablet by mouth Daily.        Pharmacy where request should be sent: [address] - [phone]  - [phone] FX     Last office visit with prescribing clinician: 9/5/2023   Last telemedicine visit with prescribing clinician: Visit date not found   Next office visit with prescribing clinician: 1/9/2024     Does the patient have less than a 3 day supply:  [x] Yes  [] No    Would you like a call back once the refill request has been completed: [] Yes [x] No    If the office needs to give you a call back, can they leave a voicemail: [] Yes [x] No    Юлия Rich Rep   10/31/23 15:59 EDT

## 2023-11-01 RX ORDER — FUROSEMIDE 20 MG/1
20 TABLET ORAL DAILY PRN
Qty: 30 TABLET | Refills: 3 | OUTPATIENT
Start: 2023-11-01

## 2023-11-01 RX ORDER — LORATADINE 10 MG/1
10 TABLET ORAL DAILY
Qty: 30 TABLET | Refills: 5 | OUTPATIENT
Start: 2023-11-01

## 2023-11-01 RX ORDER — POTASSIUM CITRATE 10 MEQ/1
10 TABLET, EXTENDED RELEASE ORAL DAILY
Qty: 30 EACH | Refills: 3 | OUTPATIENT
Start: 2023-11-01

## 2023-11-01 RX ORDER — ATORVASTATIN CALCIUM 40 MG/1
40 TABLET, FILM COATED ORAL DAILY
Qty: 90 TABLET | Refills: 1 | OUTPATIENT
Start: 2023-11-01

## 2023-11-01 RX ORDER — LISINOPRIL 10 MG/1
10 TABLET ORAL DAILY
Qty: 30 TABLET | Refills: 5 | OUTPATIENT
Start: 2023-11-01

## 2023-11-01 RX ORDER — ASPIRIN 81 MG/1
81 TABLET ORAL DAILY
Qty: 30 TABLET | Refills: 3 | OUTPATIENT
Start: 2023-11-01

## 2023-11-06 ENCOUNTER — PRE-ADMISSION TESTING (OUTPATIENT)
Dept: PREADMISSION TESTING | Facility: HOSPITAL | Age: 58
End: 2023-11-06
Payer: COMMERCIAL

## 2023-11-06 ENCOUNTER — ANESTHESIA EVENT (OUTPATIENT)
Dept: PERIOP | Facility: HOSPITAL | Age: 58
End: 2023-11-06
Payer: COMMERCIAL

## 2023-11-06 VITALS — BODY MASS INDEX: 29.34 KG/M2 | HEIGHT: 67 IN | WEIGHT: 186.95 LBS

## 2023-11-06 LAB
ANION GAP SERPL CALCULATED.3IONS-SCNC: 10 MMOL/L (ref 5–15)
BUN SERPL-MCNC: 24 MG/DL (ref 8–23)
BUN/CREAT SERPL: 25 (ref 7–25)
CALCIUM SPEC-SCNC: 9.5 MG/DL (ref 8.6–10.5)
CHLORIDE SERPL-SCNC: 101 MMOL/L (ref 98–107)
CO2 SERPL-SCNC: 28 MMOL/L (ref 22–29)
CREAT SERPL-MCNC: 0.96 MG/DL (ref 0.76–1.27)
DEPRECATED RDW RBC AUTO: 42.8 FL (ref 37–54)
EGFRCR SERPLBLD CKD-EPI 2021: 90.5 ML/MIN/1.73
ERYTHROCYTE [DISTWIDTH] IN BLOOD BY AUTOMATED COUNT: 13 % (ref 12.3–15.4)
GLUCOSE SERPL-MCNC: 136 MG/DL (ref 65–99)
HCT VFR BLD AUTO: 42.3 % (ref 37.5–51)
HGB BLD-MCNC: 14.2 G/DL (ref 13–17.7)
MCH RBC QN AUTO: 30.2 PG (ref 26.6–33)
MCHC RBC AUTO-ENTMCNC: 33.6 G/DL (ref 31.5–35.7)
MCV RBC AUTO: 90 FL (ref 79–97)
PLATELET # BLD AUTO: 195 10*3/MM3 (ref 140–450)
PMV BLD AUTO: 10.1 FL (ref 6–12)
POTASSIUM SERPL-SCNC: 4.4 MMOL/L (ref 3.5–5.2)
RBC # BLD AUTO: 4.7 10*6/MM3 (ref 4.14–5.8)
SODIUM SERPL-SCNC: 139 MMOL/L (ref 136–145)
WBC NRBC COR # BLD: 9.86 10*3/MM3 (ref 3.4–10.8)

## 2023-11-06 PROCEDURE — 93005 ELECTROCARDIOGRAM TRACING: CPT

## 2023-11-06 PROCEDURE — 85027 COMPLETE CBC AUTOMATED: CPT

## 2023-11-06 PROCEDURE — 80048 BASIC METABOLIC PNL TOTAL CA: CPT

## 2023-11-06 PROCEDURE — 36415 COLL VENOUS BLD VENIPUNCTURE: CPT

## 2023-11-06 NOTE — PAT
An arrival time for procedure was not provided during PAT visit. If patient had any questions or concerns about their arrival time, they were instructed to contact their surgeon/physician.  Additionally, if the patient referred to an arrival time that was acquired from their my chart account, patient was encouraged to verify that time with their surgeon/physician. Arrival times are NOT provided in Pre Admission Testing Department.    Per Anesthesia Request, patient instructed not to take their ACE/ARB medications on the AM of surgery.    Patient denies any current skin issues.     Patient to apply Chlorhexadine wipes  to surgical area (as instructed) the night before procedure and the AM of procedure. Wipes provided.    Patient viewed general PAT education video as instructed in their preoperative information received from their surgeon.  Patient stated the general PAT education video was viewed in its entirety and survey completed.  Copies of PAT general education handouts (Incentive Spirometry, Meds to Beds Program, Patient Belongings, Pre-op skin preparation instructions, Blood Glucose testing, Visitor policy, Surgery FAQ, Code H) distributed to patient if not printed. Education related to the PAT pass and skin preparation for surgery (if applicable) completed in PAT as a reinforcement to PAT education video. Patient instructed to return PAT pass provided today as well as completed skin preparation sheet (if applicable) on the day of procedure.     Additionally if patient had not viewed video yet but intended to view it at home or in our waiting area, then referred them to the handout with QR code/link provided during PAT visit.  Instructed patient to complete survey after viewing the video in its entirety.  Encouraged patient/family to read PAT general education handouts thoroughly and notify PAT staff with any questions or concerns. Patient verbalized understanding of all information and priority content.

## 2023-11-07 LAB
QT INTERVAL: 350 MS
QTC INTERVAL: 428 MS

## 2023-11-12 RX ORDER — FAMOTIDINE 10 MG/ML
20 INJECTION, SOLUTION INTRAVENOUS ONCE
Status: CANCELLED | OUTPATIENT
Start: 2023-11-12 | End: 2023-11-12

## 2023-11-13 ENCOUNTER — ANESTHESIA EVENT CONVERTED (OUTPATIENT)
Dept: ANESTHESIOLOGY | Facility: HOSPITAL | Age: 58
End: 2023-11-13
Payer: COMMERCIAL

## 2023-11-13 ENCOUNTER — HOSPITAL ENCOUNTER (OUTPATIENT)
Facility: HOSPITAL | Age: 58
Setting detail: HOSPITAL OUTPATIENT SURGERY
Discharge: HOME | End: 2023-11-13
Attending: SURGERY | Admitting: SURGERY
Payer: COMMERCIAL

## 2023-11-13 ENCOUNTER — ANESTHESIA (OUTPATIENT)
Dept: PERIOP | Facility: HOSPITAL | Age: 58
End: 2023-11-13
Payer: COMMERCIAL

## 2023-11-13 VITALS
RESPIRATION RATE: 16 BRPM | SYSTOLIC BLOOD PRESSURE: 142 MMHG | TEMPERATURE: 98.2 F | HEART RATE: 65 BPM | DIASTOLIC BLOOD PRESSURE: 82 MMHG | OXYGEN SATURATION: 98 %

## 2023-11-13 LAB — GLUCOSE BLDC GLUCOMTR-MCNC: 129 MG/DL (ref 70–130)

## 2023-11-13 PROCEDURE — 25010000002 ONDANSETRON PER 1 MG: Performed by: ANESTHESIOLOGY

## 2023-11-13 PROCEDURE — 25810000003 LACTATED RINGERS PER 1000 ML: Performed by: ANESTHESIOLOGY

## 2023-11-13 PROCEDURE — 82948 REAGENT STRIP/BLOOD GLUCOSE: CPT

## 2023-11-13 PROCEDURE — 25010000002 CEFAZOLIN PER 500 MG: Performed by: SURGERY

## 2023-11-13 PROCEDURE — 25010000002 SUGAMMADEX 200 MG/2ML SOLUTION: Performed by: ANESTHESIOLOGY

## 2023-11-13 PROCEDURE — 25010000002 DEXAMETHASONE PER 1 MG: Performed by: ANESTHESIOLOGY

## 2023-11-13 PROCEDURE — 25010000002 BUPIVACAINE (PF) 0.25 % SOLUTION: Performed by: NURSE ANESTHETIST, CERTIFIED REGISTERED

## 2023-11-13 PROCEDURE — C1781 MESH (IMPLANTABLE): HCPCS | Performed by: SURGERY

## 2023-11-13 PROCEDURE — 25010000002 FENTANYL CITRATE (PF) 100 MCG/2ML SOLUTION: Performed by: ANESTHESIOLOGY

## 2023-11-13 PROCEDURE — 25010000002 DEXAMETHASONE SODIUM PHOSPHATE 10 MG/ML SOLUTION: Performed by: NURSE ANESTHETIST, CERTIFIED REGISTERED

## 2023-11-13 PROCEDURE — 25010000002 PROPOFOL 10 MG/ML EMULSION: Performed by: ANESTHESIOLOGY

## 2023-11-13 DEVICE — 3DMAX™ MID ANATOMICAL MESH, LARGE, RIGHT, 4” X 6”, 10 X 16 CM
Type: IMPLANTABLE DEVICE | Site: INGUINAL | Status: FUNCTIONAL
Brand: 3DMAX™ MID ANATOMICAL MESH

## 2023-11-13 DEVICE — IMPLANTABLE DEVICE: Type: IMPLANTABLE DEVICE | Site: INGUINAL | Status: FUNCTIONAL

## 2023-11-13 DEVICE — DEV WND/CLS STRATAFIX SPIRALPDS PLS CT 2/0 15CM 26MM VIL: Type: IMPLANTABLE DEVICE | Site: INGUINAL | Status: FUNCTIONAL

## 2023-11-13 RX ORDER — ONDANSETRON 2 MG/ML
INJECTION INTRAMUSCULAR; INTRAVENOUS AS NEEDED
Status: DISCONTINUED | OUTPATIENT
Start: 2023-11-13 | End: 2023-11-13 | Stop reason: SURG

## 2023-11-13 RX ORDER — FENTANYL CITRATE 50 UG/ML
50 INJECTION, SOLUTION INTRAMUSCULAR; INTRAVENOUS
Status: DISCONTINUED | OUTPATIENT
Start: 2023-11-13 | End: 2023-11-13 | Stop reason: HOSPADM

## 2023-11-13 RX ORDER — BUPIVACAINE HYDROCHLORIDE 2.5 MG/ML
INJECTION, SOLUTION EPIDURAL; INFILTRATION; INTRACAUDAL
Status: COMPLETED | OUTPATIENT
Start: 2023-11-13 | End: 2023-11-13

## 2023-11-13 RX ORDER — ONDANSETRON 2 MG/ML
4 INJECTION INTRAMUSCULAR; INTRAVENOUS ONCE AS NEEDED
Status: DISCONTINUED | OUTPATIENT
Start: 2023-11-13 | End: 2023-11-13 | Stop reason: HOSPADM

## 2023-11-13 RX ORDER — FENTANYL CITRATE 50 UG/ML
INJECTION, SOLUTION INTRAMUSCULAR; INTRAVENOUS AS NEEDED
Status: DISCONTINUED | OUTPATIENT
Start: 2023-11-13 | End: 2023-11-13 | Stop reason: SURG

## 2023-11-13 RX ORDER — DOCUSATE SODIUM 100 MG/1
100 CAPSULE, LIQUID FILLED ORAL 2 TIMES DAILY
Qty: 30 CAPSULE | Refills: 1 | Status: SHIPPED | OUTPATIENT
Start: 2023-11-13 | End: 2024-11-12

## 2023-11-13 RX ORDER — MIDAZOLAM HYDROCHLORIDE 1 MG/ML
1 INJECTION INTRAMUSCULAR; INTRAVENOUS
Status: DISCONTINUED | OUTPATIENT
Start: 2023-11-13 | End: 2023-11-13 | Stop reason: HOSPADM

## 2023-11-13 RX ORDER — SODIUM CHLORIDE 9 MG/ML
40 INJECTION, SOLUTION INTRAVENOUS AS NEEDED
Status: DISCONTINUED | OUTPATIENT
Start: 2023-11-13 | End: 2023-11-13 | Stop reason: HOSPADM

## 2023-11-13 RX ORDER — MEPERIDINE HYDROCHLORIDE 25 MG/ML
12.5 INJECTION INTRAMUSCULAR; INTRAVENOUS; SUBCUTANEOUS
Status: DISCONTINUED | OUTPATIENT
Start: 2023-11-13 | End: 2023-11-13 | Stop reason: HOSPADM

## 2023-11-13 RX ORDER — DROPERIDOL 2.5 MG/ML
0.62 INJECTION, SOLUTION INTRAMUSCULAR; INTRAVENOUS ONCE AS NEEDED
Status: DISCONTINUED | OUTPATIENT
Start: 2023-11-13 | End: 2023-11-13 | Stop reason: HOSPADM

## 2023-11-13 RX ORDER — SODIUM CHLORIDE 0.9 % (FLUSH) 0.9 %
3-10 SYRINGE (ML) INJECTION AS NEEDED
Status: DISCONTINUED | OUTPATIENT
Start: 2023-11-13 | End: 2023-11-13 | Stop reason: HOSPADM

## 2023-11-13 RX ORDER — SODIUM CHLORIDE 0.9 % (FLUSH) 0.9 %
3 SYRINGE (ML) INJECTION EVERY 12 HOURS SCHEDULED
Status: DISCONTINUED | OUTPATIENT
Start: 2023-11-13 | End: 2023-11-13 | Stop reason: HOSPADM

## 2023-11-13 RX ORDER — PROMETHAZINE HYDROCHLORIDE 25 MG/1
25 TABLET ORAL ONCE AS NEEDED
Status: DISCONTINUED | OUTPATIENT
Start: 2023-11-13 | End: 2023-11-13 | Stop reason: HOSPADM

## 2023-11-13 RX ORDER — SODIUM CHLORIDE, SODIUM LACTATE, POTASSIUM CHLORIDE, CALCIUM CHLORIDE 600; 310; 30; 20 MG/100ML; MG/100ML; MG/100ML; MG/100ML
9 INJECTION, SOLUTION INTRAVENOUS CONTINUOUS
Status: DISCONTINUED | OUTPATIENT
Start: 2023-11-13 | End: 2023-11-13 | Stop reason: HOSPADM

## 2023-11-13 RX ORDER — OXYCODONE HYDROCHLORIDE AND ACETAMINOPHEN 5; 325 MG/1; MG/1
1 TABLET ORAL EVERY 4 HOURS PRN
Qty: 12 TABLET | Refills: 0 | Status: SHIPPED | OUTPATIENT
Start: 2023-11-13

## 2023-11-13 RX ORDER — PHENYLEPHRINE HCL IN 0.9% NACL 1 MG/10 ML
SYRINGE (ML) INTRAVENOUS AS NEEDED
Status: DISCONTINUED | OUTPATIENT
Start: 2023-11-13 | End: 2023-11-13 | Stop reason: SURG

## 2023-11-13 RX ORDER — SODIUM CHLORIDE 0.9 % (FLUSH) 0.9 %
10 SYRINGE (ML) INJECTION AS NEEDED
Status: DISCONTINUED | OUTPATIENT
Start: 2023-11-13 | End: 2023-11-13 | Stop reason: HOSPADM

## 2023-11-13 RX ORDER — MAGNESIUM HYDROXIDE 1200 MG/15ML
LIQUID ORAL AS NEEDED
Status: DISCONTINUED | OUTPATIENT
Start: 2023-11-13 | End: 2023-11-13 | Stop reason: HOSPADM

## 2023-11-13 RX ORDER — NALOXONE HCL 0.4 MG/ML
0.4 VIAL (ML) INJECTION AS NEEDED
Status: DISCONTINUED | OUTPATIENT
Start: 2023-11-13 | End: 2023-11-13 | Stop reason: HOSPADM

## 2023-11-13 RX ORDER — IPRATROPIUM BROMIDE AND ALBUTEROL SULFATE 2.5; .5 MG/3ML; MG/3ML
3 SOLUTION RESPIRATORY (INHALATION) ONCE AS NEEDED
Status: DISCONTINUED | OUTPATIENT
Start: 2023-11-13 | End: 2023-11-13 | Stop reason: HOSPADM

## 2023-11-13 RX ORDER — LIDOCAINE HYDROCHLORIDE 10 MG/ML
INJECTION, SOLUTION EPIDURAL; INFILTRATION; INTRACAUDAL; PERINEURAL AS NEEDED
Status: DISCONTINUED | OUTPATIENT
Start: 2023-11-13 | End: 2023-11-13 | Stop reason: SURG

## 2023-11-13 RX ORDER — LABETALOL HYDROCHLORIDE 5 MG/ML
5 INJECTION, SOLUTION INTRAVENOUS
Status: DISCONTINUED | OUTPATIENT
Start: 2023-11-13 | End: 2023-11-13 | Stop reason: HOSPADM

## 2023-11-13 RX ORDER — DEXAMETHASONE SODIUM PHOSPHATE 10 MG/ML
INJECTION, SOLUTION INTRAMUSCULAR; INTRAVENOUS
Status: COMPLETED | OUTPATIENT
Start: 2023-11-13 | End: 2023-11-13

## 2023-11-13 RX ORDER — HYDROCODONE BITARTRATE AND ACETAMINOPHEN 5; 325 MG/1; MG/1
1 TABLET ORAL ONCE AS NEEDED
Status: DISCONTINUED | OUTPATIENT
Start: 2023-11-13 | End: 2023-11-13 | Stop reason: HOSPADM

## 2023-11-13 RX ORDER — PROMETHAZINE HYDROCHLORIDE 25 MG/1
25 SUPPOSITORY RECTAL ONCE AS NEEDED
Status: DISCONTINUED | OUTPATIENT
Start: 2023-11-13 | End: 2023-11-13 | Stop reason: HOSPADM

## 2023-11-13 RX ORDER — ONDANSETRON 4 MG/1
4 TABLET, FILM COATED ORAL EVERY 8 HOURS PRN
Qty: 12 TABLET | Refills: 0 | Status: SHIPPED | OUTPATIENT
Start: 2023-11-13 | End: 2024-11-12

## 2023-11-13 RX ORDER — DEXAMETHASONE SODIUM PHOSPHATE 4 MG/ML
INJECTION, SOLUTION INTRA-ARTICULAR; INTRALESIONAL; INTRAMUSCULAR; INTRAVENOUS; SOFT TISSUE AS NEEDED
Status: DISCONTINUED | OUTPATIENT
Start: 2023-11-13 | End: 2023-11-13 | Stop reason: SURG

## 2023-11-13 RX ORDER — PROPOFOL 10 MG/ML
VIAL (ML) INTRAVENOUS AS NEEDED
Status: DISCONTINUED | OUTPATIENT
Start: 2023-11-13 | End: 2023-11-13 | Stop reason: SURG

## 2023-11-13 RX ORDER — ROCURONIUM BROMIDE 10 MG/ML
INJECTION, SOLUTION INTRAVENOUS AS NEEDED
Status: DISCONTINUED | OUTPATIENT
Start: 2023-11-13 | End: 2023-11-13 | Stop reason: SURG

## 2023-11-13 RX ORDER — LIDOCAINE HYDROCHLORIDE 10 MG/ML
0.5 INJECTION, SOLUTION EPIDURAL; INFILTRATION; INTRACAUDAL; PERINEURAL ONCE AS NEEDED
Status: COMPLETED | OUTPATIENT
Start: 2023-11-13 | End: 2023-11-13

## 2023-11-13 RX ORDER — HYDRALAZINE HYDROCHLORIDE 20 MG/ML
5 INJECTION INTRAMUSCULAR; INTRAVENOUS
Status: DISCONTINUED | OUTPATIENT
Start: 2023-11-13 | End: 2023-11-13 | Stop reason: HOSPADM

## 2023-11-13 RX ORDER — DROPERIDOL 2.5 MG/ML
0.62 INJECTION, SOLUTION INTRAMUSCULAR; INTRAVENOUS
Status: DISCONTINUED | OUTPATIENT
Start: 2023-11-13 | End: 2023-11-13 | Stop reason: HOSPADM

## 2023-11-13 RX ORDER — HYDROMORPHONE HYDROCHLORIDE 1 MG/ML
0.5 INJECTION, SOLUTION INTRAMUSCULAR; INTRAVENOUS; SUBCUTANEOUS
Status: DISCONTINUED | OUTPATIENT
Start: 2023-11-13 | End: 2023-11-13 | Stop reason: HOSPADM

## 2023-11-13 RX ORDER — FAMOTIDINE 20 MG/1
20 TABLET, FILM COATED ORAL ONCE
Status: COMPLETED | OUTPATIENT
Start: 2023-11-13 | End: 2023-11-13

## 2023-11-13 RX ORDER — SODIUM CHLORIDE 0.9 % (FLUSH) 0.9 %
10 SYRINGE (ML) INJECTION EVERY 12 HOURS SCHEDULED
Status: DISCONTINUED | OUTPATIENT
Start: 2023-11-13 | End: 2023-11-13 | Stop reason: HOSPADM

## 2023-11-13 RX ADMIN — FAMOTIDINE 20 MG: 20 TABLET, FILM COATED ORAL at 11:55

## 2023-11-13 RX ADMIN — SODIUM CHLORIDE, POTASSIUM CHLORIDE, SODIUM LACTATE AND CALCIUM CHLORIDE 9 ML/HR: 600; 310; 30; 20 INJECTION, SOLUTION INTRAVENOUS at 11:53

## 2023-11-13 RX ADMIN — BUPIVACAINE HYDROCHLORIDE 60 ML: 2.5 INJECTION, SOLUTION EPIDURAL; INFILTRATION; INTRACAUDAL; PERINEURAL at 12:44

## 2023-11-13 RX ADMIN — PROPOFOL 200 MG: 10 INJECTION, EMULSION INTRAVENOUS at 12:41

## 2023-11-13 RX ADMIN — FENTANYL CITRATE 50 MCG: 50 INJECTION, SOLUTION INTRAMUSCULAR; INTRAVENOUS at 13:05

## 2023-11-13 RX ADMIN — FENTANYL CITRATE 50 MCG: 50 INJECTION, SOLUTION INTRAMUSCULAR; INTRAVENOUS at 12:41

## 2023-11-13 RX ADMIN — ROCURONIUM BROMIDE 50 MG: 10 SOLUTION INTRAVENOUS at 12:41

## 2023-11-13 RX ADMIN — DEXAMETHASONE SODIUM PHOSPHATE 4 MG: 10 INJECTION, SOLUTION INTRAMUSCULAR; INTRAVENOUS at 12:44

## 2023-11-13 RX ADMIN — SODIUM CHLORIDE 2000 MG: 900 INJECTION INTRAVENOUS at 12:50

## 2023-11-13 RX ADMIN — ROCURONIUM BROMIDE 10 MG: 10 SOLUTION INTRAVENOUS at 13:25

## 2023-11-13 RX ADMIN — ONDANSETRON 4 MG: 2 INJECTION INTRAMUSCULAR; INTRAVENOUS at 14:22

## 2023-11-13 RX ADMIN — MUPIROCIN: 20 OINTMENT TOPICAL at 11:55

## 2023-11-13 RX ADMIN — LIDOCAINE HYDROCHLORIDE 50 MG: 10 INJECTION, SOLUTION EPIDURAL; INFILTRATION; INTRACAUDAL; PERINEURAL at 12:41

## 2023-11-13 RX ADMIN — PROPOFOL 25 MCG/KG/MIN: 10 INJECTION, EMULSION INTRAVENOUS at 12:48

## 2023-11-13 RX ADMIN — Medication 100 MCG: at 13:30

## 2023-11-13 RX ADMIN — LIDOCAINE HYDROCHLORIDE 0.2 ML: 10 INJECTION, SOLUTION EPIDURAL; INFILTRATION; INTRACAUDAL; PERINEURAL at 11:53

## 2023-11-13 RX ADMIN — DEXAMETHASONE SODIUM PHOSPHATE 4 MG: 4 INJECTION, SOLUTION INTRAMUSCULAR; INTRAVENOUS at 12:46

## 2023-11-13 RX ADMIN — Medication 100 MCG: at 13:00

## 2023-11-13 RX ADMIN — SUGAMMADEX 200 MG: 100 INJECTION, SOLUTION INTRAVENOUS at 14:22

## 2023-11-13 NOTE — ANESTHESIA PROCEDURE NOTES
Peripheral Block    Pre-sedation assessment completed: 11/13/2023 12:15 PM    Patient reassessed immediately prior to procedure    Patient location during procedure: OR  Start time: 11/13/2023 12:15 PM  Reason for block: at surgeon's request and post-op pain management  Performed by  CRNA/ADELE: Catracho Terrazas CRNASRNA: Amish Antonio SRNA  Preanesthetic Checklist  Completed: patient identified, IV checked, site marked, risks and benefits discussed, surgical consent, monitors and equipment checked, pre-op evaluation and timeout performed  Prep:  Pt Position: supine  Sterile barriers:cap, gloves, mask and washed/disinfected hands  Prep: ChloraPrep  Patient monitoring: blood pressure monitoring, continuous pulse oximetry and EKG  Procedure    Sedation: yes  Performed under: general  Guidance:ultrasound guided  Images:still images obtained, printed/placed on chart    Laterality:Bilateral  Block Type:TAP  Injection Technique:single-shot  Needle Type:short-bevel and echogenic  Needle Gauge:20 G  Resistance on Injection: none    Medications Used: bupivacaine PF (MARCAINE) 0.25 % injection - Injection   60 mL - 11/13/2023 12:44:00 PM  dexamethasone sodium phosphate injection - Injection   4 mg - 11/13/2023 12:44:00 PM      Medications  Comment:Block Injection:  LA dose divided between Right and Left block        Post Assessment  Injection Assessment: negative aspiration for heme, incremental injection and no paresthesia on injection  Patient Tolerance:comfortable throughout block  Complications:no  Additional Notes    Subcostal TAPs    A high-frequency linear transducer, with sterile cover, was placed sub-xiphoid to identify [place], right and left Rectus Abdominus Muscles (LINN). The transducer was moved either right or left subcostally to identify the LINN and the Transverse Abdominus Muscle (PUGH). The insertion site was prepped in sterile fashion and then localized with 2-5 ml of 1% Lidocaine. Using  "ultrasound-guidance, a 20-gauge B-Cloud 4\" Ultraplex 360 non-stimulating echogenic needle was advanced in plane, from medial to lateral, until the tip of the needle was in the fascial plane between the LINN and PUGH. 1-3ml of preservative free normal saline was used to hydro-dissect the fascial planes. After the fascial plane was verified, the local anesthetic (LA) was injected. The procedure was repeated on the opposite side for bilateral coverage. Aspiration every 5 ml to prevent intravascular injection. Injection was completed with negative aspiration of blood and negative intravascular injection. Injection pressures were normal with minimal resistance. The subcostal approach to the TAP nerve block ideally anesthetizes the intercostal nerves T6-T9.     Mid-Axillary/Lateral TAPs    A high-frequency linear transducer, with sterile cover, was placed in the midaxillary line between the ASIS and costal margin. The External Oblique Muscle (EOM), Internal Oblique Muscle (IOM), Transverse Abdominus Muscle (PUGH), and Peritoneum were identified. The insertion site was prepped in sterile fashion and then localized with 2-5 ml of 1% Lidocaine. Using ultrasound-guidance, a 20-gauge B-Cloud 4\" Ultraplex 360 non-stimulating echogenic needle was advanced in plane, from medial to lateral, until the tip of the needle was in the fascial plane between the IOM and PUGH. 1-3ml of preservative free normal saline was used to hydro-dissect the fascial planes. After the fascial plane was verified, the local anesthetic (LA) was injected. The procedure was repeated on the opposite side for bilateral coverage. Aspiration every 5 ml to prevent intravascular injection. Injection was completed with negative aspiration of blood and negative intravascular injection. Injection pressures were normal with minimal resistance. Midaxillary TAPs should reach intercostal nerves T10- T11 and the subcostal nerve T12.              "

## 2023-11-13 NOTE — ANESTHESIA PREPROCEDURE EVALUATION
Anesthesia Evaluation     Patient summary reviewed and Nursing notes reviewed                Airway   Mallampati: II  TM distance: >3 FB  Neck ROM: full  No difficulty expected  Dental - normal exam   (+) edentulous    Pulmonary - normal exam   (+) a smoker (2007) Former,  Cardiovascular - normal exam    (+) hypertension, hyperlipidemia      Neuro/Psych- negative ROS  GI/Hepatic/Renal/Endo    (+) renal disease- CRI, diabetes mellitus    Musculoskeletal (-) negative ROS    Abdominal  - normal exam    Bowel sounds: normal.   Substance History - negative use     OB/GYN negative ob/gyn ROS         Other                    Anesthesia Plan    ASA 3     general with block     intravenous induction     Anesthetic plan, risks, benefits, and alternatives have been provided, discussed and informed consent has been obtained with: patient.    Plan discussed with CRNA.    CODE STATUS:

## 2023-11-13 NOTE — OP NOTE
Operative Report    Patient Name:  Prosper Gonzalez  YOB: 1965  [id number]    11/13/2023      PREOPERATIVE DIAGNOSIS: Recurrent incarcerated left inguinal hernia, right inguinal hernia      POSTOPERATIVE DIAGNOSIS: Same        PROCEDURE PERFORMED:     Robotic Assisted Laparoscopic Transabdominal Recurrent Incarcerated Left Inguinal Hernia Repair with Mesh   Robotic Assisted Laparoscopic Transabdominal Right Inguinal Hernia Repair with mesh      SURGEON: Andrew Valerio MD      ASSISTANT: None        SPECIMENS: None       ESTIMATED BLOOD LOSS:      ANESTHESIA: General with TAP blocks.        FINDINGS:  1. A direct and indirect recurrent inguinal hernia was encountered on the left side containing incarcerated fat. This was completely reduced and repaired with a Large Bard 3D Max Mid Mesh in the preperitoneal space  2. A indirect inguinal hernia was encountered on the right side. This was completely reduced and repaired with a Large Bard 3D Max Mid Mesh in the preperitoneal space       INDICATIONS:      This patient is a 58 y.o. male who presented to my clinic with complaints of bilateral inguinal bulge and pain.  He had previously undergone open left inguinal hernia repair.. A history and physical exam was performed and found to be consistent with left recurrent incarcerated inguinal hernia as well as a right inguinal hernia. Pre-operative imaging including CT scan  confirmed the diagnosis. The risks, benefits, and alternatives of the procedure were discussed with the patient and their family preoperatively and they agreed to proceed.          DESCRIPTION OF PROCEDURE:      After obtaining informed consent, the patient was taken to the operating room and placed in the supine position on the operating room table. General anesthesia was initiated. A Burgos catheter was placed. The abdomen was prepped and draped in the usual sterile fashion. A time-out was properly performed. Antibiotics were given  preoperatively. SCDs were properly placed on the patient and turned on. A block was performed by the Anesthesia service prior to the start if the case.     Using an 11 blade scalpel a small stab incision was made in the patient's left upper quadrant at Quinn's point.  A Veress needle was then inserted into the abdominal cavity with aspiration and water drop test reassuring.  Pneumoperitoneum was established to 15 mmHg.  Next utilizing an 8 mm da Norman Optiview robotic trocar as well as a 5 mm 0 degree laparoscope, the abdomen was entered in the epigastrium under direct laparoscopic visualization utilizing a an Optiview technique.  The abdomen was surveyed with special attention to the viscera underlying the insertion site as well as underlying the veress needle site, and these were both found to be free of injury.  The Veress needle was then removed. Next two additional 8 mm robotic trocars were placed laterally to the periumbilical trocar, one on the left and one on the right side.  The abdomen was surveyed and laparoscopic exam demonstrated evidence of a indirect inguinal hernia on the right side and indirect and direct inguinal hernia on the left side.  A large Bard 3D Max mid right and left-sided mesh was then inserted into the peritoneal cavity.  The da Norman Xi robotic system was then docked at the patient's bedside and targeted in the pelvis.    A peritoneal incision was then created using cautery on the under-surface of the abdominal wall starting at the midline and proceeding laterally towards the ASIS on the left side. The peritoneum was then retracted downwards and the rectus muscle was swept upwards to develop the preperitoneal space.  The left preperitoneal space was dissected from the ASIS laterally to the pubic tubercle medially.  There was a direct defect which contained a large amount of incarcerated fat, and this was all reduced.  There was a large cord lipoma along the cord, and this was  similarly reduced.  The indirect hernia sac was carefully  from the cord structures.  This was bluntly  from the cord structures and delivered posteriorly allowing the peritoneum to lie flat.  There was some scarring in this region consistent with his history of prior repair and dissection was tedious given the large amount of incarcerated fat, but no obvious mesh.  No direct hernia or femoral hernia defect was identified.  With the dissection complete, the large Bard 3D max left-sided mesh was delivered into the preperitoneal space. The inferior aspect of the mesh was placed inferior to Star's ligament and covering the sites of direct, indirect, and femoral potential defects.  The mesh was secured medially to Star's ligament and laterally to the abdominal wall using interrupted 2-0 Vicryl suture.  With this completed, the peritoneal flap was then elevated with great care to ensure proper apposition of the mesh between the peritoneum and rectus muscle without folding. The peritoneal flap was then closed using an absorbable 2-0 Stratafix suture.      A peritoneal incision was then created using cautery on the under-surface of the abdominal wall starting at the midline and proceeding laterally towards the ASIS on the right side. The peritoneum was then retracted downwards and the rectus muscle was swept upwards to develop the preperitoneal space.  The right preperitoneal space was dissected from the ASIS laterally to the pubic tubercle medially. The spermatic cord was retracted laterally which revealed an indirect hernia sac. This was bluntly  from the cord structures and delivered posteriorly allowing the peritoneum to lie flat.  There was a cord lipoma in the indirect space which was similarly reduced.  No direct hernia or femoral hernia defect was identified.  With the dissection complete, the large Bard 3D max right-sided mesh was delivered into the preperitoneal space. The inferior  aspect of the mesh was placed inferior to Star's ligament and covering the sites of direct, indirect, and femoral potential defects.  The mesh was secured medially to Star's ligament and laterally to the abdominal wall using interrupted 2-0 Vicryl suture.  With this completed, the peritoneal flap was then elevated with great care to ensure proper apposition of the mesh between the peritoneum and rectus muscle without folding. The peritoneal flap was then closed using an absorbable 2-0 V Stratafix suture.  The da Norman XI robotic system was then undocked from the patient's bedside.          The abdomen was then carefully surveyed and hemostasis confirmed. The fascia of the midline 8 mm trocar site was closed under direct laparoscopic visualization utilizing a Philip-Chloe device with an 0 Vicryl suture.  The 8 mm trocars were removed under direct laparoscopic visualization and pneumoperitoneum was released.  Hemostasis of all wound sites was confirmed and each wound site was irrigated. All skin incisions were than closed with 4-0 Monocryl in the subcuticular layer. Mastisol and steri strips were then applied overlying each incision site.      After the procedure, the patient was awakened, extubated, and taken to the postoperative anesthesia care unit for recovery. All needle, instrument, and lap counts were correct. I was personally present and performed all portions of the procedure. There were no immediate complications         Andrew Valerio MD  11/13/2023  14:28 EST

## 2023-11-13 NOTE — H&P
Pre-Op H&P  Prosper Gonzalez  [id number]  1965      Chief complaint: Inguinal hernia      Subjective:  Patient is a 58 y.o.male presents for scheduled surgery by Dr. Valerio. He anticipates a INGUINAL HERNIA BILATERAL REPAIR WITH MESH LAPAROSCOPIC WITH DAVINCI ROBOT  today. He reports right inguinal hernia that was repaired in the 90s. He states it reoccurred about a year ago. He has had pain and right groin bulge. He denies changes in bowel habits.      Review of Systems:  Constitutional-- No fever, chills or sweats. No fatigue.  CV-- No chest pain, palpitation or syncope. +HTN, HLD  Resp-- No SOB, cough, hemoptysis  Skin--No rashes or lesions      Allergies: No Known Allergies      Home Meds:  Medications Prior to Admission   Medication Sig Dispense Refill Last Dose    albuterol sulfate  (90 Base) MCG/ACT inhaler Inhale 2 puffs Every 4 (Four) Hours As Needed for Wheezing. 18 g 2 Past Week    aspirin 81 MG EC tablet Take 1 tablet by mouth Daily. 30 tablet 3 11/12/2023 at 0700    atorvastatin (Lipitor) 40 MG tablet Take 1 tablet by mouth Daily. 90 tablet 1 11/12/2023    furosemide (LASIX) 20 MG tablet Take 1 tablet by mouth Daily As Needed (lower extremity swelling). 30 tablet 3 11/12/2023    ketoconazole (NIZORAL) 2 % shampoo Apply  topically to the appropriate area as directed 2 (Two) Times a Week. (Patient taking differently: Apply 1 application  topically to the appropriate area as directed 2 (Two) Times a Week.) 120 mL 0 Past Week    lisinopril (PRINIVIL,ZESTRIL) 10 MG tablet Take 1 tablet by mouth Daily. 30 tablet 5 11/12/2023    loratadine (Claritin) 10 MG tablet Take 1 tablet by mouth Daily. 30 tablet 5 11/12/2023    metFORMIN (GLUCOPHAGE) 1000 MG tablet Take 1 tablet by mouth 2 (Two) Times a Day With Meals. 60 tablet 5 11/13/2023    olopatadine (PATADAY) 0.2 % solution ophthalmic solution Administer 1 drop to both eyes Daily. 2.5 mL 0 11/12/2023    potassium citrate (UROCIT-K) 10 MEQ (1080 MG)  CR tablet Take 1 tablet by mouth Daily. Only take when taking Furosemide (Lasix). 30 each 3 2023    glucose blood test strip 1 each by Other route Daily. Use daily as directed 100 each 12     glucose monitor monitoring kit 1 each Daily. Use daily as directed. 1 each 0     Lancets Thin misc 1 each Daily. Use daily as directed 100 each 11     Sod Picosulfate-Mag Ox-Cit Acd 10-3.5-12 MG-GM -GM/160ML solution Take 320 mL by mouth Take As Directed. Please follow instructions that were mailed to your home. If you did not receive these instructions please call our office at [phone]. (Patient not taking: Reported on 2023) 320 mL 0          PMH:   Past Medical History:   Diagnosis Date    Asthma     Depression     Diabetes mellitus     Hyperlipidemia     Hypertension     Kidney stone      PSH:    Past Surgical History:   Procedure Laterality Date    HERNIA REPAIR      UMBILICAL    LEG SURGERY  2018    ISABELLA AND 3 PLATES       Immunization History:  Influenza: UTD  Pneumococcal: No  Tetanus: UTD  Covid : x1    Social History:   Tobacco:   Social History     Tobacco Use   Smoking Status Former    Packs/day: 1.00    Years: 34.00    Additional pack years: 0.00    Total pack years: 34.00    Types: Cigarettes    Start date: 1973    Quit date: 2007    Years since quittin.8   Smokeless Tobacco Never      Alcohol:     Social History     Substance and Sexual Activity   Alcohol Use Not Currently    Comment: QUIT 4.5YEARS AGO         Physical Exam:/74 (BP Location: Right arm, Patient Position: Lying)   Pulse 81   Temp 98.1 °F (36.7 °C) (Temporal)   Resp 18   SpO2 98%       General Appearance:    Alert, cooperative, no distress, appears stated age   Head:    Normocephalic, without obvious abnormality, atraumatic   Lungs:     Clear to auscultation bilaterally, respirations unlabored    Heart:   Regular rate and rhythm, S1 and S2 normal    Abdomen:    Soft without tenderness   Extremities:    Extremities normal, atraumatic, no cyanosis or edema   Skin:   Skin color, texture, turgor normal, no rashes or lesions   Neurologic:   Grossly intact     Results Review:     LABS:  Lab Results   Component Value Date    WBC 9.86 11/06/2023    HGB 14.2 11/06/2023    HCT 42.3 11/06/2023    MCV 90.0 11/06/2023     11/06/2023    NEUTROABS 5.57 06/26/2023    GLUCOSE 136 (H) 11/06/2023    BUN 24 (H) 11/06/2023    CREATININE 0.96 11/06/2023     11/06/2023    K 4.4 11/06/2023     11/06/2023    CO2 28.0 11/06/2023    MG 1.8 (L) 06/26/2023    CALCIUM 9.5 11/06/2023       RADIOLOGY:  Imaging Results (Last 72 Hours)       ** No results found for the last 72 hours. **            I reviewed the patient's new clinical results.    Cancer Staging (if applicable)  Cancer Patient: __ yes __no __unknown; If yes, clinical stage T:__ N:__M:__, stage group or __N/A      Impression: Inguinal hernia      Plan: INGUINAL HERNIA BILATERAL REPAIR WITH MESH LAPAROSCOPIC WITH DAVINCI ROBOT       BAO Sandy   11/13/2023   11:46 EST

## 2023-11-13 NOTE — ANESTHESIA PROCEDURE NOTES
Airway  Urgency: elective    Date/Time: 11/13/2023 12:43 PM  Airway not difficult    General Information and Staff    Patient location during procedure: OR  SRNA: Amish Antonio SRNA  Indications and Patient Condition  Indications for airway management: airway protection    Preoxygenated: yes  MILS not maintained throughout  Mask difficulty assessment: 2 - vent by mask + OA or adjuvant +/- NMBA    Final Airway Details  Final airway type: endotracheal airway      Successful airway: ETT  Cuffed: yes   Successful intubation technique: direct laryngoscopy  Facilitating devices/methods: intubating stylet  Endotracheal tube insertion site: oral  Blade: Natalie  Blade size: 3  ETT size (mm): 7.0  Cormack-Lehane Classification: grade I - full view of glottis  Placement verified by: chest auscultation and capnometry   Measured from: lips  ETT/EBT  to lips (cm): 20  Number of attempts at approach: 1  Assessment: lips, teeth, and gum same as pre-op and atraumatic intubation    Additional Comments  Negative epigastric sounds, Breath sound equal bilaterally with symmetric chest rise and fall

## 2023-11-13 NOTE — ANESTHESIA POSTPROCEDURE EVALUATION
Patient: Prosper Gonzalez    Procedure Summary       Date: 11/13/23 Room / Location:  [facility]  /  [facility]    Anesthesia Start: 1234 Anesthesia Stop: 1439    Procedure: INGUINAL HERNIA BILATERAL REPAIR WITH MESH LAPAROSCOPIC WITH DAVINCI ROBOT (Bilateral: Abdomen) Diagnosis:     Surgeons: Andrew Valerio MD Provider: Ten Fox MD    Anesthesia Type: general with block ASA Status: 3            Anesthesia Type: general with block    Vitals  Vitals Value Taken Time   /78 11/13/23 1436   Temp 98    Pulse 73 11/13/23 1438   Resp 12    SpO2 98 % 11/13/23 1438   Vitals shown include unfiled device data.        Post Anesthesia Care and Evaluation    Patient location during evaluation: PACU  Patient participation: complete - patient participated  Level of consciousness: sleepy but conscious  Pain management: adequate    Airway patency: patent  Anesthetic complications: No anesthetic complications  PONV Status: none  Cardiovascular status: hemodynamically stable and acceptable  Respiratory status: nonlabored ventilation, acceptable and nasal cannula  Hydration status: acceptable

## 2023-11-14 ENCOUNTER — TELEPHONE (OUTPATIENT)
Dept: FAMILY MEDICINE CLINIC | Facility: CLINIC | Age: 58
End: 2023-11-14
Payer: COMMERCIAL

## 2023-11-14 NOTE — TELEPHONE ENCOUNTER
Caller: Prosper Gonzalez    Relationship: Self    Best call back number: [phone]     What is the best time to reach you: ANYTIME    Who are you requesting to speak with (clinical staff, provider,  specific staff member): CLINICAL    Do you know the name of the person who called: PROSPER    What was the call regarding: PATIENT WANTS TO GIVE AN UPDATE ON HIS HERNIA SURGERY.    Is it okay if the provider responds through MyChart: NO

## 2023-11-15 NOTE — TELEPHONE ENCOUNTER
Hub may relay message and document     Attempted to reach back out to patient to see if they had questions or concerns. The hernia surgery notes are in EPIC where the providers can see and review.   If patient is needing a follow up appointment his provider is still out of the office on maternity leave, however he can schedule with another provider within the office to follow up in the meantime.       Name: Prosper Gonzalez Elaina    Relationship: Self    Best Callback Number: [phone]     HUB PROVIDED THE RELAY MESSAGE FROM THE OFFICE   PATIENT VOICED UNDERSTANDING AND HAS NO FURTHER QUESTIONS AT THIS TIME    ADDITIONAL INFORMATION:

## 2023-11-15 NOTE — TELEPHONE ENCOUNTER
Unable to leave message for Prosper Gonzalez  to return my call. Mailbox has not been set up    Hub may relay message and document    Attempted to reach back out to patient to see if they had questions or concerns. The hernia surgery notes are in EPIC where the providers can see and review.   If patient is needing a follow up appointment his provider is still out of the office on maternity leave, however he can schedule with another provider within the office to follow up in the meantime.

## 2023-12-06 ENCOUNTER — OFFICE VISIT (OUTPATIENT)
Dept: FAMILY MEDICINE CLINIC | Facility: CLINIC | Age: 58
End: 2023-12-06
Payer: COMMERCIAL

## 2023-12-06 ENCOUNTER — HOSPITAL ENCOUNTER (OUTPATIENT)
Dept: GENERAL RADIOLOGY | Facility: HOSPITAL | Age: 58
Discharge: HOME | End: 2023-12-06
Admitting: PHYSICIAN ASSISTANT
Payer: COMMERCIAL

## 2023-12-06 VITALS
OXYGEN SATURATION: 99 % | DIASTOLIC BLOOD PRESSURE: 58 MMHG | HEART RATE: 91 BPM | WEIGHT: 196.6 LBS | BODY MASS INDEX: 30.86 KG/M2 | SYSTOLIC BLOOD PRESSURE: 124 MMHG | HEIGHT: 67 IN

## 2023-12-06 DIAGNOSIS — R05.3 PERSISTENT COUGH FOR 3 WEEKS OR LONGER: Primary | ICD-10-CM

## 2023-12-06 DIAGNOSIS — R05.3 PERSISTENT COUGH FOR 3 WEEKS OR LONGER: ICD-10-CM

## 2023-12-06 DIAGNOSIS — J30.2 SEASONAL ALLERGIES: ICD-10-CM

## 2023-12-06 DIAGNOSIS — Z87.891 FORMER TOBACCO USE: ICD-10-CM

## 2023-12-06 DIAGNOSIS — J34.89 RHINORRHEA: ICD-10-CM

## 2023-12-06 LAB
EXPIRATION DATE: NORMAL
FLUAV AG UPPER RESP QL IA.RAPID: NOT DETECTED
FLUBV AG UPPER RESP QL IA.RAPID: NOT DETECTED
INTERNAL CONTROL: NORMAL
Lab: NORMAL
SARS-COV-2 AG UPPER RESP QL IA.RAPID: NOT DETECTED

## 2023-12-06 PROCEDURE — 87428 SARSCOV & INF VIR A&B AG IA: CPT | Performed by: PHYSICIAN ASSISTANT

## 2023-12-06 PROCEDURE — 3074F SYST BP LT 130 MM HG: CPT | Performed by: PHYSICIAN ASSISTANT

## 2023-12-06 PROCEDURE — 3051F HG A1C>EQUAL 7.0%<8.0%: CPT | Performed by: PHYSICIAN ASSISTANT

## 2023-12-06 PROCEDURE — 71046 X-RAY EXAM CHEST 2 VIEWS: CPT

## 2023-12-06 PROCEDURE — 3078F DIAST BP <80 MM HG: CPT | Performed by: PHYSICIAN ASSISTANT

## 2023-12-06 PROCEDURE — 99214 OFFICE O/P EST MOD 30 MIN: CPT | Performed by: PHYSICIAN ASSISTANT

## 2023-12-06 RX ORDER — FLUTICASONE PROPIONATE 50 MCG
2 SPRAY, SUSPENSION (ML) NASAL DAILY
Qty: 16 G | Refills: 1 | Status: SHIPPED | OUTPATIENT
Start: 2023-12-06

## 2023-12-06 NOTE — PROGRESS NOTES
"    Chief Complaint   Patient presents with    Cough     Exposed to covid by employer  Feeling off for about a month     Nasal Congestion       HPI     Prosper Gonzalez is a pleasant 58 y.o. male with a history of asthma who presents for evaluation of \"chief complaint.\"     Patient c/o cough and runny nose for the past month. Clear rhinorrhea. He was hoping symptoms were related to allergies. He is treated with claritin. Presents today because his employer tested positive for COVID and he requires testing. At times produces white sputum. He denies fever. Admits to some shortness of breath the past couple of days for which albuterol has helped. He is using albuterol daily. His cough is mostly at night but a little throughout the day. He denies wheezing at night or awakening overnight short of breath. Admits to mild dyspnea with exertion. No chest pain. Former tobacco use for 32 years, quit in . He has not had recent pulmonary function testing.     Past Medical History:   Diagnosis Date    Asthma     Depression     Diabetes mellitus     Hyperlipidemia     Hypertension     Kidney stone     Wrist fracture, left     surgery to repair       Past Surgical History:   Procedure Laterality Date    ANKLE SURGERY Right     3 plates    HERNIA REPAIR      UMBILICAL- done in the     INGUINAL HERNIA REPAIR Bilateral 2023    Procedure: INGUINAL HERNIA BILATERAL REPAIR WITH MESH LAPAROSCOPIC WITH [employer] ROBOT;  Surgeon: Andrew Valerio MD;  Location: [facility];  Service: Robotics - DaVinci;  Laterality: Bilateral;    LEG SURGERY Left     ISABELLA AND 3 PLATES       History reviewed. No pertinent family history.    Social History     Socioeconomic History    Marital status:    Tobacco Use    Smoking status: Former     Packs/day: 1.00     Years: 34.00     Additional pack years: 0.00     Total pack years: 34.00     Types: Cigarettes     Start date: 1973     Quit date: 2007     Years since quittin.9 "    Smokeless tobacco: Never   Vaping Use    Vaping Use: Never used   Substance and Sexual Activity    Alcohol use: Not Currently     Comment: QUIT 4.5YEARS AGO    Drug use: Not Currently    Sexual activity: Defer       No Known Allergies    ROS    Review of Systems   Constitutional:  Negative for chills, diaphoresis and fever.   HENT:  Positive for congestion and rhinorrhea. Negative for sore throat.    Respiratory:  Positive for cough and shortness of breath. Negative for wheezing.    Gastrointestinal:  Positive for diarrhea. Negative for GERD.   Musculoskeletal:  Negative for myalgias.   Neurological:  Negative for headache.       Vitals:    12/06/23 1245   BP: 124/58   Pulse: 91   SpO2: 99%     Body mass index is 30.78 kg/m².      Current Outpatient Medications:     albuterol sulfate  (90 Base) MCG/ACT inhaler, Inhale 2 puffs Every 4 (Four) Hours As Needed for Wheezing., Disp: 18 g, Rfl: 2    aspirin 81 MG EC tablet, Take 1 tablet by mouth Daily., Disp: 30 tablet, Rfl: 3    atorvastatin (Lipitor) 40 MG tablet, Take 1 tablet by mouth Daily., Disp: 90 tablet, Rfl: 1    docusate sodium (Colace) 100 MG capsule, Take 1 capsule by mouth 2 (Two) Times a Day., Disp: 30 capsule, Rfl: 1    furosemide (LASIX) 20 MG tablet, Take 1 tablet by mouth Daily As Needed (lower extremity swelling)., Disp: 30 tablet, Rfl: 3    glucose blood test strip, 1 each by Other route Daily. Use daily as directed, Disp: 100 each, Rfl: 12    glucose monitor monitoring kit, 1 each Daily. Use daily as directed., Disp: 1 each, Rfl: 0    ketoconazole (NIZORAL) 2 % shampoo, Apply  topically to the appropriate area as directed 2 (Two) Times a Week. (Patient taking differently: Apply 1 application  topically to the appropriate area as directed 2 (Two) Times a Week.), Disp: 120 mL, Rfl: 0    Lancets Thin misc, 1 each Daily. Use daily as directed, Disp: 100 each, Rfl: 11    lisinopril (PRINIVIL,ZESTRIL) 10 MG tablet, Take 1 tablet by mouth Daily.,  Disp: 30 tablet, Rfl: 5    loratadine (Claritin) 10 MG tablet, Take 1 tablet by mouth Daily., Disp: 30 tablet, Rfl: 5    metFORMIN (GLUCOPHAGE) 1000 MG tablet, Take 1 tablet by mouth 2 (Two) Times a Day With Meals., Disp: 60 tablet, Rfl: 5    olopatadine (PATADAY) 0.2 % solution ophthalmic solution, Administer 1 drop to both eyes Daily., Disp: 2.5 mL, Rfl: 0    ondansetron (Zofran) 4 MG tablet, Take 1 tablet by mouth Every 8 (Eight) Hours As Needed for Nausea or Vomiting., Disp: 12 tablet, Rfl: 0    oxyCODONE-acetaminophen (Percocet) 5-325 MG per tablet, Take 1 tablet by mouth Every 4 (Four) Hours As Needed for Moderate Pain., Disp: 12 tablet, Rfl: 0    potassium citrate (UROCIT-K) 10 MEQ (1080 MG) CR tablet, Take 1 tablet by mouth Daily. Only take when taking Furosemide (Lasix)., Disp: 30 each, Rfl: 3    Sod Picosulfate-Mag Ox-Cit Acd 10-3.5-12 MG-GM -GM/160ML solution, Take 320 mL by mouth Take As Directed. Please follow instructions that were mailed to your home. If you did not receive these instructions please call our office at [phone]., Disp: 320 mL, Rfl: 0    fluticasone (FLONASE) 50 MCG/ACT nasal spray, 2 sprays into the nostril(s) as directed by provider Daily., Disp: 16 g, Rfl: 1    PE    Physical Exam  Vitals reviewed.   Constitutional:       General: He is not in acute distress.     Appearance: He is well-developed.   HENT:      Head: Normocephalic.      Right Ear: Tympanic membrane and ear canal normal. Tympanic membrane is not erythematous.      Left Ear: Tympanic membrane and ear canal normal. Tympanic membrane is not erythematous.      Nose:      Right Turbinates: Swollen and pale.      Left Turbinates: Swollen and pale.      Right Sinus: No maxillary sinus tenderness or frontal sinus tenderness.      Left Sinus: No maxillary sinus tenderness or frontal sinus tenderness.      Mouth/Throat:      Mouth: Mucous membranes are moist.      Pharynx: Uvula midline.      Tonsils: No tonsillar exudate.    Eyes:      General:         Right eye: No discharge.         Left eye: No discharge.      Conjunctiva/sclera: Conjunctivae normal.   Cardiovascular:      Rate and Rhythm: Normal rate and regular rhythm.      Heart sounds: Normal heart sounds.   Pulmonary:      Effort: Pulmonary effort is normal. No respiratory distress.      Breath sounds: Normal breath sounds. No wheezing, rhonchi or rales.   Musculoskeletal:      Cervical back: Normal range of motion and neck supple.   Lymphadenopathy:      Cervical: No cervical adenopathy.      Upper Body:      Right upper body: No supraclavicular adenopathy.      Left upper body: No supraclavicular adenopathy.   Skin:     General: Skin is warm and dry.   Neurological:      Mental Status: He is alert.   Psychiatric:         Behavior: Behavior normal.          A/P    Problem List Items Addressed This Visit          Pulmonary and Pneumonias    Persistent cough for 3 weeks or longer - Primary    Current Assessment & Plan     Recent work exposure to COVID - negative test today.   Start flonase. I also gave the patient some samples of Breztri to try to see if this will help his congestion and dyspnea.   Will order CXR and PFTs due to his history of significant tobacco use.   I counseled him to return to our office if his cough persists for more than 2 weeks or worsens. He may need a CT of the chest for further evaluation if his chest x-ray is negative.          Relevant Orders    POCT SARS-CoV-2 + Flu Antigen SWATI (Completed)    XR Chest PA & Lateral    Complete PFT - Pre & Post Bronchodilator       Tobacco    Former tobacco use    Overview     34 pack years, quit >15 years ago         Relevant Orders    Complete PFT - Pre & Post Bronchodilator     Other Visit Diagnoses       Seasonal allergies        Rhinorrhea                Plan of care was reviewed with patient at the conclusion of today's visit. Education was provided regarding diagnoses, management, prescribed or recommended  OTC products, and the importance of compliance with follow-up appointments. The patient was counseled regarding the risks, benefits, and possible side-effects of treatment. I advised the patient to keep me informed of any acute changes in their status including new, worsening, or persistent symptoms. Patient expresses understanding and agreement with the management plan.        Сергей Johnson PA-C

## 2023-12-06 NOTE — ASSESSMENT & PLAN NOTE
Recent work exposure to COVID - negative test today.   Start flonase. I also gave the patient some samples of Breztri to try to see if this will help his congestion and dyspnea.   Will order CXR and PFTs due to his history of significant tobacco use.   I counseled him to return to our office if his cough persists for more than 2 weeks or worsens. He may need a CT of the chest for further evaluation if his chest x-ray is negative.

## 2023-12-08 ENCOUNTER — TELEPHONE (OUTPATIENT)
Dept: FAMILY MEDICINE CLINIC | Facility: CLINIC | Age: 58
End: 2023-12-08
Payer: COMMERCIAL

## 2023-12-08 RX ORDER — AZITHROMYCIN 250 MG/1
TABLET, FILM COATED ORAL
Qty: 6 TABLET | Refills: 0 | Status: SHIPPED | OUTPATIENT
Start: 2023-12-08

## 2023-12-08 NOTE — TELEPHONE ENCOUNTER
..Attempted to contact patient no answer left  Detailed vm     ...HUB MAY RELAY MESSAGE AND DOCUMENT    Message from Сергей Johnson PA-C sent at 12/8/2023    Please let this patient know his chest x-ray is consistent with bronchitis.  I will also send an antibiotic to his pharmacy to start.

## 2023-12-08 NOTE — TELEPHONE ENCOUNTER
Name: Prosper Gonzalez    Relationship: Self    Best Callback Number: [phone]    HUB PROVIDED THE RELAY MESSAGE FROM THE OFFICE   PATIENT VOICED UNDERSTANDING AND HAS NO FURTHER QUESTIONS AT THIS TIME    ADDITIONAL INFORMATION: PATIENT CONFIRMED [facility] WITH [facility]

## 2024-01-31 ENCOUNTER — OFFICE VISIT (OUTPATIENT)
Dept: FAMILY MEDICINE CLINIC | Facility: CLINIC | Age: 59
End: 2024-01-31
Payer: COMMERCIAL

## 2024-01-31 VITALS
OXYGEN SATURATION: 98 % | DIASTOLIC BLOOD PRESSURE: 90 MMHG | HEIGHT: 67 IN | WEIGHT: 196.4 LBS | BODY MASS INDEX: 30.83 KG/M2 | HEART RATE: 77 BPM | SYSTOLIC BLOOD PRESSURE: 130 MMHG

## 2024-01-31 DIAGNOSIS — Z87.891 FORMER TOBACCO USE: ICD-10-CM

## 2024-01-31 DIAGNOSIS — I10 PRIMARY HYPERTENSION: ICD-10-CM

## 2024-01-31 DIAGNOSIS — M79.672 LEFT FOOT PAIN: ICD-10-CM

## 2024-01-31 DIAGNOSIS — J30.2 SEASONAL ALLERGIES: ICD-10-CM

## 2024-01-31 DIAGNOSIS — E78.5 HYPERLIPIDEMIA LDL GOAL <70: ICD-10-CM

## 2024-01-31 DIAGNOSIS — E11.65 TYPE 2 DIABETES MELLITUS WITH HYPERGLYCEMIA, WITHOUT LONG-TERM CURRENT USE OF INSULIN: Primary | ICD-10-CM

## 2024-01-31 DIAGNOSIS — Z12.5 PROSTATE CANCER SCREENING: ICD-10-CM

## 2024-01-31 DIAGNOSIS — Z00.00 ANNUAL PHYSICAL EXAM: ICD-10-CM

## 2024-01-31 DIAGNOSIS — Z23 IMMUNIZATION DUE: ICD-10-CM

## 2024-01-31 DIAGNOSIS — Z12.11 ENCOUNTER FOR SCREENING FOR MALIGNANT NEOPLASM OF COLON: ICD-10-CM

## 2024-01-31 RX ORDER — LISINOPRIL 10 MG/1
10 TABLET ORAL DAILY
Qty: 30 TABLET | Refills: 5 | Status: SHIPPED | OUTPATIENT
Start: 2024-01-31

## 2024-01-31 RX ORDER — ATORVASTATIN CALCIUM 40 MG/1
40 TABLET, FILM COATED ORAL DAILY
Qty: 90 TABLET | Refills: 3 | Status: SHIPPED | OUTPATIENT
Start: 2024-01-31

## 2024-01-31 RX ORDER — LORATADINE 10 MG/1
10 TABLET ORAL DAILY
Qty: 30 TABLET | Refills: 5 | Status: SHIPPED | OUTPATIENT
Start: 2024-01-31

## 2024-01-31 NOTE — PROGRESS NOTES
Physical Exam     Patient Name: Prosper Gonzalez  : 1965   MRN: [id number]   Care Team: Patient Care Team:  Moriah Calderon DO as PCP - General (Internal Medicine)    Chief Complaint:    Chief Complaint   Patient presents with    Annual Exam       History of Present Illness: Prosper Gonzalez is a 58 y.o. male with HTN, T2DM, HLD who is presents today for annual healthcare maintenance.     Reports he has been eating more sweets lately.   He is compliant with all medications.     Reports some left foot discomfort around the arch. Interested in podiatry referral. Wears supportive shoes.    PHQ-2 Depression Screening  Little interest or pleasure in doing things? 0-->not at all   Feeling down, depressed, or hopeless? 0-->not at all   PHQ-2 Total Score 0         Health Maintenance   Topic Date Due    COLORECTAL CANCER SCREENING  Never done    ZOSTER VACCINE (1 of 2) Never done    DIABETIC EYE EXAM  Never done    COVID-19 Vaccine (2 - - season) 2024 (Originally 2023)    LIPID PANEL  2024    BMI FOLLOWUP  2024    HEMOGLOBIN A1C  2024    URINE MICROALBUMIN  2024    ANNUAL PHYSICAL  2025    DIABETIC FOOT EXAM  2025    TDAP/TD VACCINES (2 - Td or Tdap) 2028    HEPATITIS C SCREENING  Completed    Pneumococcal Vaccine 0-64  Completed    INFLUENZA VACCINE  Completed       Subjective      Review of Systems:   Review of Systems - See HPI    Past Medical History:   Past Medical History:   Diagnosis Date    Asthma     Depression     Diabetes mellitus     Hyperlipidemia     Hypertension     Kidney stone     Wrist fracture, left     surgery to repair       Past Surgical History:   Past Surgical History:   Procedure Laterality Date    ANKLE SURGERY Right     3 plates    HERNIA REPAIR      UMBILICAL- done in the     INGUINAL HERNIA REPAIR Bilateral 2023    Procedure: INGUINAL HERNIA BILATERAL REPAIR WITH MESH LAPAROSCOPIC WITH [employer] ROBOT;  Surgeon: Teodoro  Andrew DE LA PAZ MD;  Location: [facility];  Service: Robotics - DaV[employer]i;  Laterality: Bilateral;    LEG SURGERY Left 2018    ISABELLA AND 3 PLATES       Family History: History reviewed. No pertinent family history.    Social History:   Social History     Socioeconomic History    Marital status:    Tobacco Use    Smoking status: Former     Packs/day: 1.00     Years: 34.00     Additional pack years: 0.00     Total pack years: 34.00     Types: Cigarettes     Start date: 1973     Quit date: 2007     Years since quittin.0    Smokeless tobacco: Never   Vaping Use    Vaping Use: Never used   Substance and Sexual Activity    Alcohol use: Not Currently     Comment: QUIT 4.5YEARS AGO    Drug use: Not Currently    Sexual activity: Defer       Tobacco History:   Social History     Tobacco Use   Smoking Status Former    Packs/day: 1.00    Years: 34.00    Additional pack years: 0.00    Total pack years: 34.00    Types: Cigarettes    Start date: 1973    Quit date: 2007    Years since quittin.0   Smokeless Tobacco Never       Medications:     Current Outpatient Medications:     albuterol sulfate  (90 Base) MCG/ACT inhaler, Inhale 2 puffs Every 4 (Four) Hours As Needed for Wheezing., Disp: 18 g, Rfl: 2    aspirin 81 MG EC tablet, Take 1 tablet by mouth Daily., Disp: 30 tablet, Rfl: 3    atorvastatin (Lipitor) 40 MG tablet, Take 1 tablet by mouth Daily., Disp: 90 tablet, Rfl: 3    doxycycline (MONODOX) 100 MG capsule, Take 1 capsule by mouth 2 (Two) Times a Day., Disp: 10 capsule, Rfl: 0    fluticasone (FLONASE) 50 MCG/ACT nasal spray, 2 sprays into the nostril(s) as directed by provider Daily., Disp: 16 g, Rfl: 1    furosemide (LASIX) 20 MG tablet, Take 1 tablet by mouth Daily As Needed (lower extremity swelling)., Disp: 30 tablet, Rfl: 3    glucose blood test strip, 1 each by Other route Daily. Use daily as directed, Disp: 100 each, Rfl: 12    glucose monitor monitoring kit, 1 each Daily. Use daily  "as directed., Disp: 1 each, Rfl: 0    ketoconazole (NIZORAL) 2 % shampoo, Apply  topically to the appropriate area as directed 2 (Two) Times a Week. (Patient taking differently: Apply 1 application  topically to the appropriate area as directed 2 (Two) Times a Week.), Disp: 120 mL, Rfl: 0    Lancets Thin misc, 1 each Daily. Use daily as directed, Disp: 100 each, Rfl: 11    lisinopril (PRINIVIL,ZESTRIL) 10 MG tablet, Take 1 tablet by mouth Daily., Disp: 30 tablet, Rfl: 5    loratadine (Claritin) 10 MG tablet, Take 1 tablet by mouth Daily., Disp: 30 tablet, Rfl: 5    metFORMIN (GLUCOPHAGE) 1000 MG tablet, Take 1 tablet by mouth 2 (Two) Times a Day With Meals., Disp: 60 tablet, Rfl: 5    olopatadine (PATADAY) 0.2 % solution ophthalmic solution, Administer 1 drop to both eyes Daily., Disp: 2.5 mL, Rfl: 0    potassium citrate (UROCIT-K) 10 MEQ (1080 MG) CR tablet, Take 1 tablet by mouth Daily. Only take when taking Furosemide (Lasix)., Disp: 30 each, Rfl: 3    Allergies:   No Known Allergies    Past Medical History, Social History, Family History and Care Team were all reviewed with patient and updated as appropriate.       Objective     Physical Exam:  Vital Signs:   Vitals:    01/31/24 1231   BP: 130/90   Pulse: 77   SpO2: 98%   Weight: 89.1 kg (196 lb 6.4 oz)   Height: 170.2 cm (67\")     Body mass index is 30.76 kg/m².     Physical Exam  Vitals reviewed.   Constitutional:       Appearance: Normal appearance.   Cardiovascular:      Rate and Rhythm: Normal rate.   Pulmonary:      Effort: Pulmonary effort is normal. No respiratory distress.   Musculoskeletal:      Comments: Bilateral feet appear normal, no ulceration, no callus, no skin break down. Pulses intact. No discoloration. Sensation intact.   Skin:     General: Skin is warm and dry.   Neurological:      Mental Status: He is alert.   Psychiatric:         Mood and Affect: Mood normal.         Behavior: Behavior normal.         Judgment: Judgment normal. "         Assessment / Plan      Assessment/Plan:   Problems Addressed This Visit  Diagnoses and all orders for this visit:    1. Type 2 diabetes mellitus with hyperglycemia, without long-term current use of insulin (Primary)  -     CBC (No Diff); Future  -     Lipid Panel; Future  -     TSH; Future  -     Ambulatory Referral to Podiatry  -     metFORMIN (GLUCOPHAGE) 1000 MG tablet; Take 1 tablet by mouth 2 (Two) Times a Day With Meals.  Dispense: 60 tablet; Refill: 5  -     Ambulatory Referral to Diabetic Education    2. Encounter for screening for malignant neoplasm of colon  -     Cologuard - Stool, Per Rectum; Future    3. Primary hypertension  -     CBC (No Diff); Future  -     Lipid Panel; Future  -     TSH; Future  -     lisinopril (PRINIVIL,ZESTRIL) 10 MG tablet; Take 1 tablet by mouth Daily.  Dispense: 30 tablet; Refill: 5  -     atorvastatin (Lipitor) 40 MG tablet; Take 1 tablet by mouth Daily.  Dispense: 90 tablet; Refill: 3    4. Former tobacco use    5. Hyperlipidemia LDL goal <70  -     Lipid Panel; Future    6. Immunization due    7. Left foot pain  -     Ambulatory Referral to Podiatry    8. Seasonal allergies  -     loratadine (Claritin) 10 MG tablet; Take 1 tablet by mouth Daily.  Dispense: 30 tablet; Refill: 5    9. Prostate cancer screening  -     PSA SCREENING; Future    10. Annual physical exam  -     Cologuard - Stool, Per Rectum; Future        Healthcare Maintenance   Vaccines:  -COVID19 boosted declined  -Shingrix encouraged at pharmacy     Cancer Screening  -Cologuard ordered today  -PSA today   -LDCT not indicated, quit >15 years ago     Other  -PHQ score 0  -Counseled on importance of maintaining healthy diet and routine exercise. Encouraged 150 min exercise per week.  -Tobacco cessation: former smoker quit in 2007  -Blood pressure is at goal <130/80  -Metabolic screening today    Encouraged routine eye and dental exams.     T2DM - A1c yesterday 7.3% - likely due to dietary noncompliance.  Encouraged dietary improvement - referred to diabetes education for nutrition guidance   Continue Metformin 1000mg BID  Continue ACEi and statin  Urine micro UTD - continue ACEi   Eye exam 7/2023 at [facility]    HTN - BP slightly elevated today 130/90, typically normotensive. Will continue to monitor and discussed goal of <130/80, continue lisinopril 10mg daily.         Plan of care reviewed with patient at the conclusion of today's visit. Education was provided regarding diagnosis and management.  Patient verbalizes understanding of and agreement with management plan.    Follow Up:   Return in about 3 months (around 4/30/2024) for Recheck .        DO DENVER Dickens RD  [facility]  [address]  [facility] [address]  Fax [phone]  Phone [phone]

## 2024-03-28 DIAGNOSIS — J06.9 VIRAL UPPER RESPIRATORY TRACT INFECTION: ICD-10-CM

## 2024-03-28 RX ORDER — BROMPHENIRAMINE MALEATE, PSEUDOEPHEDRINE HYDROCHLORIDE, AND DEXTROMETHORPHAN HYDROBROMIDE 2; 30; 10 MG/5ML; MG/5ML; MG/5ML
5 SYRUP ORAL 4 TIMES DAILY PRN
Qty: 473 ML | Refills: 0 | Status: SHIPPED | OUTPATIENT
Start: 2024-03-28

## 2024-05-03 ENCOUNTER — LAB (OUTPATIENT)
Dept: LAB | Facility: HOSPITAL | Age: 59
End: 2024-05-03
Payer: COMMERCIAL

## 2024-05-03 ENCOUNTER — OFFICE VISIT (OUTPATIENT)
Dept: FAMILY MEDICINE CLINIC | Facility: CLINIC | Age: 59
End: 2024-05-03
Payer: COMMERCIAL

## 2024-05-03 VITALS
OXYGEN SATURATION: 97 % | SYSTOLIC BLOOD PRESSURE: 128 MMHG | BODY MASS INDEX: 30.06 KG/M2 | HEART RATE: 94 BPM | DIASTOLIC BLOOD PRESSURE: 76 MMHG | WEIGHT: 191.5 LBS | HEIGHT: 67 IN

## 2024-05-03 DIAGNOSIS — I10 PRIMARY HYPERTENSION: ICD-10-CM

## 2024-05-03 DIAGNOSIS — E11.65 TYPE 2 DIABETES MELLITUS WITH HYPERGLYCEMIA, WITHOUT LONG-TERM CURRENT USE OF INSULIN: ICD-10-CM

## 2024-05-03 DIAGNOSIS — Z12.5 PROSTATE CANCER SCREENING: ICD-10-CM

## 2024-05-03 DIAGNOSIS — I87.2 VENOUS INSUFFICIENCY OF BOTH LOWER EXTREMITIES: ICD-10-CM

## 2024-05-03 DIAGNOSIS — E11.65 TYPE 2 DIABETES MELLITUS WITH HYPERGLYCEMIA, WITHOUT LONG-TERM CURRENT USE OF INSULIN: Primary | ICD-10-CM

## 2024-05-03 DIAGNOSIS — R60.0 BILATERAL LOWER EXTREMITY EDEMA: ICD-10-CM

## 2024-05-03 DIAGNOSIS — E78.5 HYPERLIPIDEMIA LDL GOAL <70: ICD-10-CM

## 2024-05-03 PROBLEM — F10.21 ALCOHOLISM IN RECOVERY: Status: ACTIVE | Noted: 2024-05-03

## 2024-05-03 LAB
DEPRECATED RDW RBC AUTO: 41.5 FL (ref 37–54)
ERYTHROCYTE [DISTWIDTH] IN BLOOD BY AUTOMATED COUNT: 12.8 % (ref 12.3–15.4)
EXPIRATION DATE: ABNORMAL
HBA1C MFR BLD: 6.9 % (ref 4.5–5.7)
HCT VFR BLD AUTO: 41.7 % (ref 37.5–51)
HGB BLD-MCNC: 13.5 G/DL (ref 13–17.7)
Lab: ABNORMAL
MCH RBC QN AUTO: 28.6 PG (ref 26.6–33)
MCHC RBC AUTO-ENTMCNC: 32.4 G/DL (ref 31.5–35.7)
MCV RBC AUTO: 88.3 FL (ref 79–97)
PLATELET # BLD AUTO: 222 10*3/MM3 (ref 140–450)
PMV BLD AUTO: 11.2 FL (ref 6–12)
RBC # BLD AUTO: 4.72 10*6/MM3 (ref 4.14–5.8)
WBC NRBC COR # BLD AUTO: 8.88 10*3/MM3 (ref 3.4–10.8)

## 2024-05-03 PROCEDURE — 3044F HG A1C LEVEL LT 7.0%: CPT | Performed by: STUDENT IN AN ORGANIZED HEALTH CARE EDUCATION/TRAINING PROGRAM

## 2024-05-03 PROCEDURE — 80061 LIPID PANEL: CPT

## 2024-05-03 PROCEDURE — 99214 OFFICE O/P EST MOD 30 MIN: CPT | Performed by: STUDENT IN AN ORGANIZED HEALTH CARE EDUCATION/TRAINING PROGRAM

## 2024-05-03 PROCEDURE — 3074F SYST BP LT 130 MM HG: CPT | Performed by: STUDENT IN AN ORGANIZED HEALTH CARE EDUCATION/TRAINING PROGRAM

## 2024-05-03 PROCEDURE — 85027 COMPLETE CBC AUTOMATED: CPT

## 2024-05-03 PROCEDURE — G0103 PSA SCREENING: HCPCS

## 2024-05-03 PROCEDURE — 84443 ASSAY THYROID STIM HORMONE: CPT

## 2024-05-03 PROCEDURE — 83036 HEMOGLOBIN GLYCOSYLATED A1C: CPT | Performed by: STUDENT IN AN ORGANIZED HEALTH CARE EDUCATION/TRAINING PROGRAM

## 2024-05-03 PROCEDURE — 3078F DIAST BP <80 MM HG: CPT | Performed by: STUDENT IN AN ORGANIZED HEALTH CARE EDUCATION/TRAINING PROGRAM

## 2024-05-03 RX ORDER — FUROSEMIDE 20 MG/1
20-40 TABLET ORAL DAILY PRN
Qty: 60 TABLET | Refills: 3 | Status: SHIPPED | OUTPATIENT
Start: 2024-05-03

## 2024-05-03 RX ORDER — POTASSIUM CHLORIDE 20 MEQ/1
20 TABLET, EXTENDED RELEASE ORAL DAILY
Qty: 30 TABLET | Refills: 5 | Status: SHIPPED | OUTPATIENT
Start: 2024-05-03

## 2024-05-03 NOTE — PROGRESS NOTES
Established Office Visit      Patient Name: rPosper Gonzalez  : 1965   MRN: [id number]   Care Team: Patient Care Team:  Moriah Calderon DO as PCP - General (Internal Medicine)    Chief Complaint:    Chief Complaint   Patient presents with    Diabetes    Hypertension       History of Present Illness: Prosper Gonzalez is a 59 y.o. male with HTN, T2DM, HLD who is here today to follow up with chronic medical problems.     Reports his legs are sweelling more often lately. Improves with compression socks. Without them, they swell every day. Currently taking lasix 20mg daily. Worse on days when he is on his feet all day. He works at [employer] and reports significant amount of seasoning/sodium intake.     HTN - well controlled with Lisinopril     T2DM - A1c today 6.9% compliant with Metformin 1000mg BID    Subjective      Review of Systems:   Review of Systems - See HPI    I have reviewed and the following portions of the patient's history were updated as appropriate: past family history, past medical history, past social history, past surgical history and problem list.    Medications:     Current Outpatient Medications:     albuterol sulfate  (90 Base) MCG/ACT inhaler, Inhale 2 puffs Every 4 (Four) Hours As Needed for Wheezing., Disp: 18 g, Rfl: 2    aspirin 81 MG EC tablet, Take 1 tablet by mouth Daily., Disp: 30 tablet, Rfl: 3    atorvastatin (Lipitor) 40 MG tablet, Take 1 tablet by mouth Daily., Disp: 90 tablet, Rfl: 3    fluticasone (FLONASE) 50 MCG/ACT nasal spray, 2 sprays into the nostril(s) as directed by provider Daily As Needed for Rhinitis., Disp: 16 g, Rfl: 0    furosemide (LASIX) 20 MG tablet, Take 1-2 tablets by mouth Daily As Needed (lower extremity swelling)., Disp: 60 tablet, Rfl: 3    glucose blood test strip, 1 each by Other route Daily. Use daily as directed, Disp: 100 each, Rfl: 12    glucose monitor monitoring kit, 1 each Daily. Use daily as directed., Disp: 1 each, Rfl: 0     "Lancets Thin misc, 1 each Daily. Use daily as directed, Disp: 100 each, Rfl: 11    lisinopril (PRINIVIL,ZESTRIL) 10 MG tablet, Take 1 tablet by mouth Daily., Disp: 30 tablet, Rfl: 5    loratadine (Claritin) 10 MG tablet, Take 1 tablet by mouth Daily., Disp: 30 tablet, Rfl: 5    metFORMIN (GLUCOPHAGE) 1000 MG tablet, Take 1 tablet by mouth 2 (Two) Times a Day With Meals., Disp: 60 tablet, Rfl: 5    olopatadine (PATADAY) 0.2 % solution ophthalmic solution, Administer 1 drop to both eyes Daily., Disp: 2.5 mL, Rfl: 0    potassium chloride (KLOR-CON M20) 20 MEQ CR tablet, Take 1 tablet by mouth Daily. As needed. Take on days when taking Lasix., Disp: 30 tablet, Rfl: 5    Allergies:   No Known Allergies    Objective     Physical Exam:  Vital Signs:   Vitals:    05/03/24 1050   BP: 128/76   Pulse: 94   SpO2: 97%   Weight: 86.9 kg (191 lb 8 oz)   Height: 170.2 cm (67\")     Body mass index is 29.99 kg/m².     Physical Exam  Vitals reviewed.   Constitutional:       Appearance: Normal appearance.   Cardiovascular:      Rate and Rhythm: Normal rate.      Comments: Trace b/l LE edema  Wearing compression socks   Pulmonary:      Effort: Pulmonary effort is normal. No respiratory distress.   Skin:     General: Skin is warm and dry.   Neurological:      Mental Status: He is alert.   Psychiatric:         Mood and Affect: Mood normal.         Behavior: Behavior normal.         Judgment: Judgment normal.         Assessment / Plan      Assessment/Plan:   Problems Addressed This Visit  Diagnoses and all orders for this visit:    1. Type 2 diabetes mellitus with hyperglycemia, without long-term current use of insulin (Primary)  -     POC Glycosylated Hemoglobin (Hb A1C)    A1c 6.9% today, well controlled   Continue Metformin 1000mg BID  Continue ACEi and statin. Due for lipid panel, today.  Urine micro UTD - continue ACEi   Eye exam 7/2023 at [facility]    2. Primary hypertension  -     furosemide (LASIX) 20 MG tablet; Take 1-2 tablets by " mouth Daily As Needed (lower extremity swelling).  Dispense: 60 tablet; Refill: 3  Well controlled with lisinopril 10mg daily     3. Venous insufficiency of both lower extremities  -     furosemide (LASIX) 20 MG tablet; Take 1-2 tablets by mouth Daily As Needed (lower extremity swelling).  Dispense: 60 tablet; Refill: 3  -     potassium chloride (KLOR-CON M20) 20 MEQ CR tablet; Take 1 tablet by mouth Daily. As needed. Take on days when taking Lasix.  Dispense: 30 tablet; Refill: 5  We discussed common causes of edema and venous stasis. Discussed contributing factors such as hereditary nature, obesity, trauma, age, sedentary lifestyle etc. I explained how these factors cause edema (due to gravity, etc)   He does find relief with lasix 20-40mg prn, ok to continue this but counseled on spare use and will take potassium supplement with this. I recommend compression stockings, leg elevation, weight loss, and low sodium diet.    4. Bilateral lower extremity edema          Plan of care reviewed with patient at the conclusion of today's visit. Education was provided regarding diagnosis and management.  Patient verbalizes understanding of and agreement with management plan.    Follow Up: 6 months   No follow-ups on file.        DO DENVER Dickens RD  [facility]  [address]  [facility] [address]  Fax [phone]  Phone [phone]

## 2024-05-04 LAB
CHOLEST SERPL-MCNC: 142 MG/DL (ref 0–200)
HDLC SERPL-MCNC: 54 MG/DL (ref 40–60)
LDLC SERPL CALC-MCNC: 58 MG/DL (ref 0–100)
LDLC/HDLC SERPL: 0.95 {RATIO}
PSA SERPL-MCNC: 0.31 NG/ML (ref 0–4)
TRIGL SERPL-MCNC: 184 MG/DL (ref 0–150)
TSH SERPL DL<=0.05 MIU/L-ACNC: 2.6 UIU/ML (ref 0.27–4.2)
VLDLC SERPL-MCNC: 30 MG/DL (ref 5–40)

## 2024-10-07 DIAGNOSIS — Z79.82 CURRENT USE OF ASPIRIN: ICD-10-CM

## 2024-10-08 RX ORDER — ASPIRIN 81 MG/1
81 TABLET ORAL DAILY
Qty: 90 TABLET | Refills: 0 | Status: SHIPPED | OUTPATIENT
Start: 2024-10-08

## 2024-11-01 ENCOUNTER — OFFICE VISIT (OUTPATIENT)
Dept: FAMILY MEDICINE CLINIC | Facility: CLINIC | Age: 59
End: 2024-11-01
Payer: COMMERCIAL

## 2024-11-01 VITALS
DIASTOLIC BLOOD PRESSURE: 92 MMHG | WEIGHT: 192.2 LBS | HEIGHT: 67 IN | SYSTOLIC BLOOD PRESSURE: 140 MMHG | HEART RATE: 88 BPM | OXYGEN SATURATION: 96 % | BODY MASS INDEX: 30.17 KG/M2

## 2024-11-01 DIAGNOSIS — Z23 IMMUNIZATION DUE: ICD-10-CM

## 2024-11-01 DIAGNOSIS — E11.65 TYPE 2 DIABETES MELLITUS WITH HYPERGLYCEMIA, WITHOUT LONG-TERM CURRENT USE OF INSULIN: Primary | ICD-10-CM

## 2024-11-01 DIAGNOSIS — I10 PRIMARY HYPERTENSION: ICD-10-CM

## 2024-11-01 DIAGNOSIS — Z59.819 HOUSING INSECURITY: ICD-10-CM

## 2024-11-01 DIAGNOSIS — E11.65 TYPE 2 DIABETES MELLITUS WITH HYPERGLYCEMIA, WITHOUT LONG-TERM CURRENT USE OF INSULIN: ICD-10-CM

## 2024-11-01 DIAGNOSIS — Z59.86 FINANCIAL INSECURITY: ICD-10-CM

## 2024-11-01 DIAGNOSIS — J30.2 SEASONAL ALLERGIES: ICD-10-CM

## 2024-11-01 LAB
ALBUMIN UR-MCNC: 2.2 MG/DL
CREAT UR-MCNC: 76.7 MG/DL
EXPIRATION DATE: ABNORMAL
HBA1C MFR BLD: 11.7 % (ref 4.5–5.7)
Lab: ABNORMAL
MICROALBUMIN/CREAT UR: 28.7 MG/G (ref 0–29)

## 2024-11-01 PROCEDURE — 82570 ASSAY OF URINE CREATININE: CPT | Performed by: STUDENT IN AN ORGANIZED HEALTH CARE EDUCATION/TRAINING PROGRAM

## 2024-11-01 PROCEDURE — 82043 UR ALBUMIN QUANTITATIVE: CPT | Performed by: STUDENT IN AN ORGANIZED HEALTH CARE EDUCATION/TRAINING PROGRAM

## 2024-11-01 RX ORDER — LISINOPRIL 10 MG/1
10 TABLET ORAL DAILY
Qty: 30 TABLET | Refills: 5 | Status: SHIPPED | OUTPATIENT
Start: 2024-11-01

## 2024-11-01 RX ORDER — LORATADINE 10 MG/1
10 TABLET ORAL DAILY
Qty: 30 TABLET | Refills: 5 | Status: SHIPPED | OUTPATIENT
Start: 2024-11-01

## 2024-11-01 RX ORDER — LANCETS
1 EACH MISCELLANEOUS DAILY
Qty: 100 EACH | Refills: 11 | Status: SHIPPED | OUTPATIENT
Start: 2024-11-01

## 2024-11-01 SDOH — ECONOMIC STABILITY - INCOME SECURITY: FINANCIAL INSECURITY: Z59.86

## 2024-11-01 SDOH — ECONOMIC STABILITY - HOUSING INSECURITY: HOUSING INSTABILITY UNSPECIFIED: Z59.819

## 2024-11-01 NOTE — PROGRESS NOTES
Established Office Visit      Patient Name: Prosper Gonzalez  : 1965   MRN: [id number]   Care Team: Patient Care Team:  Moriah Calderon DO as PCP - General (Internal Medicine)    Chief Complaint:    Chief Complaint   Patient presents with    Hypertension    Diabetes       History of Present Illness: Prosper Gonzalez is a 59 y.o. male with HTN, T2DM, HLD who is here today to follow up with chronic medical conditions.     HTN - uncontrolled 140/92 today. He reports not taking lisinopril consistently. Forgets to fill his pill box sometimes.     T2DM - inconsistently taking metformin 1000mg BID. A1c has worsened from 6.9% to 11% today. He reports significant change in his dietary habits. 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 He reports eating significant amount of donuts, sweets, potatoes. He is uncomfortable with any injectable medication.   Admits to excess thirst. Denies polyuria. Denies numbness/tingling/discomfort in feet. Has ingrown toenails.     He continues to follow with substance abuse program at [facility]. This is going well. He has been clean now for >5 years. He is completely sober. He has had trouble finding housing related to financial difficulties. He has trouble finding healthy food options.       Subjective      Review of Systems:   Review of Systems - See HPI    I have reviewed and the following portions of the patient's history were updated as appropriate: past family history, past medical history, past social history, past surgical history and problem list.    Medications:     Current Outpatient Medications:     acetaminophen (TYLENOL) 500 MG tablet, Take 1 tablet by mouth Every 6 (Six) Hours As Needed for Moderate Pain., Disp: 30 tablet, Rfl: 0    albuterol sulfate  (90 Base) MCG/ACT inhaler, Inhale 2 puffs Every 4 (Four) Hours As Needed for Wheezing., Disp: 18 g, Rfl: 2    aspirin (Aspirin Adult Low Strength) 81  "MG EC tablet, TAKE 1 TABLET BY MOUTH DAILY., Disp: 90 tablet, Rfl: 0    atorvastatin (Lipitor) 40 MG tablet, Take 1 tablet by mouth Daily., Disp: 90 tablet, Rfl: 3    fluticasone (FLONASE) 50 MCG/ACT nasal spray, 2 sprays into the nostril(s) as directed by provider Daily As Needed for Rhinitis., Disp: 16 g, Rfl: 0    furosemide (LASIX) 20 MG tablet, Take 1-2 tablets by mouth Daily As Needed (lower extremity swelling)., Disp: 60 tablet, Rfl: 3    glucose blood test strip, 1 each by Other route Daily. Use daily as directed, Disp: 100 each, Rfl: 12    glucose monitor monitoring kit, 1 each Daily. Use daily as directed., Disp: 1 each, Rfl: 0    Lancets Thin misc, Use 1 each Daily. Use daily as directed, Disp: 100 each, Rfl: 11    lisinopril (PRINIVIL,ZESTRIL) 10 MG tablet, Take 1 tablet by mouth Daily., Disp: 30 tablet, Rfl: 5    loratadine (Claritin) 10 MG tablet, Take 1 tablet by mouth Daily., Disp: 30 tablet, Rfl: 5    metFORMIN (GLUCOPHAGE) 1000 MG tablet, Take 1 tablet by mouth 2 (Two) Times a Day With Meals., Disp: 60 tablet, Rfl: 5    olopatadine (PATADAY) 0.2 % solution ophthalmic solution, Administer 1 drop to both eyes Daily., Disp: 2.5 mL, Rfl: 0    potassium chloride (KLOR-CON M20) 20 MEQ CR tablet, Take 1 tablet by mouth Daily. As needed. Take on days when taking Lasix., Disp: 30 tablet, Rfl: 5    empagliflozin (Jardiance) 10 MG tablet tablet, Take 1 tablet by mouth Daily., Disp: 30 tablet, Rfl: 5    Allergies:   No Known Allergies    Objective     Physical Exam:  Vital Signs:   Vitals:    11/01/24 0923   BP: 140/92   Pulse: 88   SpO2: 96%   Weight: 87.2 kg (192 lb 3.2 oz)   Height: 170.2 cm (67\")     Body mass index is 30.1 kg/m².     Physical Exam  Vitals reviewed.   Constitutional:       Appearance: Normal appearance. He is not ill-appearing.   Cardiovascular:      Rate and Rhythm: Normal rate.   Pulmonary:      Effort: Pulmonary effort is normal. No respiratory distress.   Musculoskeletal:      " Comments: Left foot - ingrown toes, thickening consistent with onychomycosis    Skin:     General: Skin is warm and dry.   Neurological:      Mental Status: He is alert.   Psychiatric:         Mood and Affect: Mood normal.         Behavior: Behavior normal.         Judgment: Judgment normal.         Assessment / Plan      Assessment/Plan:   Problems Addressed This Visit  Diagnoses and all orders for this visit:    1. Type 2 diabetes mellitus with hyperglycemia, without long-term current use of insulin (Primary)  -     POC Glycosylated Hemoglobin (Hb A1C)  -     Microalbumin / Creatinine Urine Ratio - Urine, Clean Catch; Future  -     empagliflozin (Jardiance) 10 MG tablet tablet; Take 1 tablet by mouth Daily.  Dispense: 30 tablet; Refill: 5  -     Footwear Diabetic  -     metFORMIN (GLUCOPHAGE) 1000 MG tablet; Take 1 tablet by mouth 2 (Two) Times a Day With Meals.  Dispense: 60 tablet; Refill: 5  -     glucose blood test strip; 1 each by Other route Daily. Use daily as directed  Dispense: 100 each; Refill: 12  -     Lancets Thin misc; Use 1 each Daily. Use daily as directed  Dispense: 100 each; Refill: 11    2. Immunization due  -     Fluzone >6mos ([id number])    3. Primary hypertension  -     lisinopril (PRINIVIL,ZESTRIL) 10 MG tablet; Take 1 tablet by mouth Daily.  Dispense: 30 tablet; Refill: 5    4. Seasonal allergies  -     loratadine (Claritin) 10 MG tablet; Take 1 tablet by mouth Daily.  Dispense: 30 tablet; Refill: 5    5. Financial insecurity  -     Ambulatory Referral to Social Care Services (Amb Case Mgmt)    6. Housing insecurity  -     Ambulatory Referral to Social Care Services (Amb Case Mgmt)      T2DM - uncontrolled A1c 11%, worsening due to dietary and medication noncompliance  Recommend strict compliance with metformin 1000mg BID, add Jardiance 10mg daily. Discussed alternative options and patient is not interested in injectable options at this time.  Sent new glucose monitoring supply - RTC in 4  weeks with log.   LDL at goal <70 as of lipid panel 05/2024. Continue Atorvastatin.  Continue ACEi   Requested records for eye exam - completed earlier this year  Follows with podiatry for foot exam - agree with diabetic shoes   Urine micro ordered today   Recommended nutrition/diabetes education referral but he declines due to not having time between recovery meetings and working.     Flu shot today    HTN - uncontrolled due to medication noncompliance. Discussed goal <130/80 and importance of compliance with lisinopril. RTC in 4 weeks to reevaluate.         Plan of care reviewed with patient at the conclusion of today's visit. Education was provided regarding diagnosis and management.  Patient verbalizes understanding of and agreement with management plan.    Follow Up:   Return in about 1 month (around 12/1/2024) for Recheck T2DM, HTN .        DO DENVER Dickens RD  [facility]  [address]  [facility] [address]  Fax [phone]  Phone [phone]

## 2024-11-04 ENCOUNTER — REFERRAL TRIAGE (OUTPATIENT)
Age: 59
End: 2024-11-04
Payer: COMMERCIAL

## 2024-11-06 ENCOUNTER — PATIENT OUTREACH (OUTPATIENT)
Age: 59
End: 2024-11-06
Payer: COMMERCIAL

## 2024-11-06 NOTE — OUTREACH NOTE
SW attempted an outreach and left a voicemail with callback information. SUNDEEP will attempt again in two days.  Martha CHAMBERS -   Ambulatory Case Management    11/6/2024, 11:05 EST

## 2024-11-15 ENCOUNTER — PATIENT OUTREACH (OUTPATIENT)
Age: 59
End: 2024-11-15
Payer: COMMERCIAL

## 2024-11-15 NOTE — OUTREACH NOTE
SW made three attempts to contact pt for follow up. SW left voicemail messages with contact information. SW will close referral but will re-open should pt call back.  Martha CHAMBERS -   Ambulatory Case Management    11/15/2024, 11:13 EST

## 2024-12-06 ENCOUNTER — OFFICE VISIT (OUTPATIENT)
Dept: FAMILY MEDICINE CLINIC | Facility: CLINIC | Age: 59
End: 2024-12-06
Payer: COMMERCIAL

## 2024-12-06 VITALS
HEART RATE: 82 BPM | DIASTOLIC BLOOD PRESSURE: 88 MMHG | HEIGHT: 67 IN | OXYGEN SATURATION: 98 % | BODY MASS INDEX: 29.98 KG/M2 | SYSTOLIC BLOOD PRESSURE: 130 MMHG | WEIGHT: 191 LBS

## 2024-12-06 DIAGNOSIS — I10 PRIMARY HYPERTENSION: Primary | ICD-10-CM

## 2024-12-06 DIAGNOSIS — E78.5 HYPERLIPIDEMIA LDL GOAL <70: ICD-10-CM

## 2024-12-06 DIAGNOSIS — E11.65 TYPE 2 DIABETES MELLITUS WITH HYPERGLYCEMIA, WITHOUT LONG-TERM CURRENT USE OF INSULIN: ICD-10-CM

## 2024-12-06 DIAGNOSIS — F10.20 ALCOHOLISM: ICD-10-CM

## 2024-12-06 PROCEDURE — 3046F HEMOGLOBIN A1C LEVEL >9.0%: CPT | Performed by: STUDENT IN AN ORGANIZED HEALTH CARE EDUCATION/TRAINING PROGRAM

## 2024-12-06 PROCEDURE — 1160F RVW MEDS BY RX/DR IN RCRD: CPT | Performed by: STUDENT IN AN ORGANIZED HEALTH CARE EDUCATION/TRAINING PROGRAM

## 2024-12-06 PROCEDURE — 99214 OFFICE O/P EST MOD 30 MIN: CPT | Performed by: STUDENT IN AN ORGANIZED HEALTH CARE EDUCATION/TRAINING PROGRAM

## 2024-12-06 PROCEDURE — 3075F SYST BP GE 130 - 139MM HG: CPT | Performed by: STUDENT IN AN ORGANIZED HEALTH CARE EDUCATION/TRAINING PROGRAM

## 2024-12-06 PROCEDURE — 1159F MED LIST DOCD IN RCRD: CPT | Performed by: STUDENT IN AN ORGANIZED HEALTH CARE EDUCATION/TRAINING PROGRAM

## 2024-12-06 PROCEDURE — 3079F DIAST BP 80-89 MM HG: CPT | Performed by: STUDENT IN AN ORGANIZED HEALTH CARE EDUCATION/TRAINING PROGRAM

## 2024-12-06 PROCEDURE — 1126F AMNT PAIN NOTED NONE PRSNT: CPT | Performed by: STUDENT IN AN ORGANIZED HEALTH CARE EDUCATION/TRAINING PROGRAM

## 2024-12-06 RX ORDER — CLONIDINE HYDROCHLORIDE 0.1 MG/1
TABLET ORAL
COMMUNITY
Start: 2024-11-06

## 2024-12-06 RX ORDER — ONDANSETRON 4 MG/1
TABLET, ORALLY DISINTEGRATING ORAL
COMMUNITY
Start: 2024-11-06

## 2024-12-06 RX ORDER — IBUPROFEN 400 MG/1
TABLET, FILM COATED ORAL
COMMUNITY
Start: 2024-11-06

## 2024-12-06 RX ORDER — DICYCLOMINE HYDROCHLORIDE 10 MG/1
CAPSULE ORAL
COMMUNITY
Start: 2024-11-06

## 2024-12-06 RX ORDER — HYDROXYZINE PAMOATE 25 MG/1
CAPSULE ORAL
COMMUNITY
Start: 2024-11-06

## 2024-12-06 NOTE — PROGRESS NOTES
Established Office Visit      Patient Name: Prosper Gonzalez  : 1965   MRN: [id number]   Care Team: Patient Care Team:  Moriah Calderon DO as PCP - General (Internal Medicine)    Chief Complaint:    Chief Complaint   Patient presents with    Type 2 diabetes mellitus with hyperglycemia, without long-t       History of Present Illness: Prosper Gonzalez is a 59 y.o. male with HTN, T2DM, HLD who is here today to follow up with chronic medical conditions.    He reports unfortunately relapsing with his alcohol use a few weeks ago. He reached out to his sober living home for help and was admitted to 21 day rehab program in [place]. He is back at sober living home now.   This is going well. He is planning to move into his own home in January with his friend and sister.      T2DM - reports while he was in rehabilitation treatment he was having his glucose checked regularly and reports most readings were around 100 with medication compliance. He has been strictly compliant with metformin and jardiance. Polydipsia has improved.     HTN - improved with lisinopril compliance.       Subjective      Review of Systems:   Review of Systems - See HPI    I have reviewed and the following portions of the patient's history were updated as appropriate: past family history, past medical history, past social history, past surgical history and problem list.    Medications:     Current Outpatient Medications:     acetaminophen (TYLENOL) 500 MG tablet, Take 1 tablet by mouth Every 6 (Six) Hours As Needed for Moderate Pain., Disp: 30 tablet, Rfl: 0    albuterol sulfate  (90 Base) MCG/ACT inhaler, Inhale 2 puffs Every 4 (Four) Hours As Needed for Wheezing., Disp: 18 g, Rfl: 2    aspirin (Aspirin Adult Low Strength) 81 MG EC tablet, TAKE 1 TABLET BY MOUTH DAILY., Disp: 90 tablet, Rfl: 0    atorvastatin (Lipitor) 40 MG tablet, Take 1 tablet by mouth Daily., Disp: 90 tablet, Rfl: 3    cloNIDine (CATAPRES) 0.1 MG tablet, ,  "Disp: , Rfl:     dicyclomine (BENTYL) 10 MG capsule, , Disp: , Rfl:     empagliflozin (Jardiance) 25 MG tablet tablet, Take 1 tablet by mouth Daily., Disp: 90 tablet, Rfl: 3    fluticasone (FLONASE) 50 MCG/ACT nasal spray, 2 sprays into the nostril(s) as directed by provider Daily As Needed for Rhinitis., Disp: 16 g, Rfl: 0    furosemide (LASIX) 20 MG tablet, Take 1-2 tablets by mouth Daily As Needed (lower extremity swelling)., Disp: 60 tablet, Rfl: 3    glucose blood test strip, 1 each by Other route Daily. Use daily as directed, Disp: 100 each, Rfl: 12    glucose monitor monitoring kit, 1 each Daily. Use daily as directed., Disp: 1 each, Rfl: 0    hydrOXYzine pamoate (VISTARIL) 25 MG capsule, , Disp: , Rfl:     ibuprofen (ADVIL,MOTRIN) 400 MG tablet, , Disp: , Rfl:     Lancets Thin misc, Use 1 each Daily. Use daily as directed, Disp: 100 each, Rfl: 11    lisinopril (PRINIVIL,ZESTRIL) 10 MG tablet, Take 1 tablet by mouth Daily., Disp: 30 tablet, Rfl: 5    loratadine (Claritin) 10 MG tablet, Take 1 tablet by mouth Daily., Disp: 30 tablet, Rfl: 5    metFORMIN (GLUCOPHAGE) 1000 MG tablet, Take 1 tablet by mouth 2 (Two) Times a Day With Meals., Disp: 60 tablet, Rfl: 5    naloxone (NARCAN) 4 MG/0.1ML nasal spray, , Disp: , Rfl:     olopatadine (PATADAY) 0.2 % solution ophthalmic solution, Administer 1 drop to both eyes Daily., Disp: 2.5 mL, Rfl: 0    ondansetron ODT (ZOFRAN-ODT) 4 MG disintegrating tablet, , Disp: , Rfl:     potassium chloride (KLOR-CON M20) 20 MEQ CR tablet, Take 1 tablet by mouth Daily. As needed. Take on days when taking Lasix., Disp: 30 tablet, Rfl: 5    Allergies:   No Known Allergies    Objective     Physical Exam:  Vital Signs:   Vitals:    12/06/24 1159   BP: 130/88   Pulse: 82   SpO2: 98%   Weight: 86.6 kg (191 lb)   Height: 170.2 cm (67\")     Body mass index is 29.91 kg/m².     Physical Exam    Assessment / Plan      Assessment/Plan:   Problems Addressed This Visit  Diagnoses and all orders " for this visit:    1. Primary hypertension (Primary)    2. Type 2 diabetes mellitus with hyperglycemia, without long-term current use of insulin  -     empagliflozin (Jardiance) 25 MG tablet tablet; Take 1 tablet by mouth Daily.  Dispense: 90 tablet; Refill: 3    3. Alcoholism    4. Hyperlipidemia LDL goal <70        T2DM - uncontrolled A1c 11% last month, 10.4% today - improving with medication compliance.   Recommend strict compliance with metformin 1000mg BID, increase Jardiance to 25mg daily. Discussed alternative options and patient is not interested in injectable options at this time.  Continue monitoring glucose TID  LDL at goal <70 as of lipid panel 05/2024. Continue Atorvastatin.  Continue ACEi   Eye exam completed 2024  Follows with podiatry for foot exam - agree with diabetic shoes   Urine micro 11/2024  Previously declined nutrition/diabetes education referral    HTN - diastolic still slightly elevated today although improving wit lisinopril compliance. Continue 10mg daily.    Alcoholism - congratulated on completion of 21 day rehab after recent relapse. Continue with sober living program.     Plan of care reviewed with patient at the conclusion of today's visit. Education was provided regarding diagnosis and management.  Patient verbalizes understanding of and agreement with management plan.    Follow Up:   Return in about 3 months (around 3/6/2025) for Annual physical.        DO DENVER Dickens RD  [facility]  [address]  [facility] [address]  Fax [phone]  Phone [phone]

## 2025-01-28 ENCOUNTER — TELEPHONE (OUTPATIENT)
Dept: FAMILY MEDICINE CLINIC | Facility: CLINIC | Age: 60
End: 2025-01-28
Payer: COMMERCIAL

## 2025-01-28 NOTE — TELEPHONE ENCOUNTER
Caller: Prosper Gonzalez    Relationship to patient: Self    Best call back number:     [phone]     PATIENT IS CALLING TO STATE THAT THE FOOT DOCTOR AT [facility] FAXED A FORM OVER FOR HIS DIABETIC SHOES AND WANTED TO KNOW THE STATUS.

## 2025-02-10 ENCOUNTER — TELEPHONE (OUTPATIENT)
Dept: FAMILY MEDICINE CLINIC | Facility: CLINIC | Age: 60
End: 2025-02-10
Payer: COMMERCIAL

## 2025-02-10 NOTE — TELEPHONE ENCOUNTER
Caller: Prosper Gonzalez    Relationship: Self    Best call back number: [phone]     What form or medical record are you requesting: PAPER WORK FOR DIABETIC SHOES     Who is requesting this form or medical record from you: PATIENT    How would you like to receive the form or medical records (pick-up, mail, fax):    If fax, what is the fax number:   If mail, what is the address:  If pick-up, provide patient with address and location details    Timeframe paperwork needed: ASAP    Additional notes: PATIENT IS GOING TO STOP BY AND  A COPY OF THE PAPER WORK THAT WAS SCANNED INTO HIS CHART REGARDING HIS DIABETIC SHOES.           “If this is a medical records request, please allow up to 30 days from the time the signed release form is received to process this request.”

## 2025-03-14 ENCOUNTER — OFFICE VISIT (OUTPATIENT)
Dept: FAMILY MEDICINE CLINIC | Facility: CLINIC | Age: 60
End: 2025-03-14
Payer: COMMERCIAL

## 2025-03-14 ENCOUNTER — LAB (OUTPATIENT)
Dept: LAB | Facility: HOSPITAL | Age: 60
End: 2025-03-14
Payer: COMMERCIAL

## 2025-03-14 VITALS
BODY MASS INDEX: 29.66 KG/M2 | DIASTOLIC BLOOD PRESSURE: 88 MMHG | OXYGEN SATURATION: 98 % | WEIGHT: 189 LBS | SYSTOLIC BLOOD PRESSURE: 138 MMHG | HEIGHT: 67 IN | HEART RATE: 95 BPM

## 2025-03-14 DIAGNOSIS — E11.65 TYPE 2 DIABETES MELLITUS WITH HYPERGLYCEMIA, WITHOUT LONG-TERM CURRENT USE OF INSULIN: ICD-10-CM

## 2025-03-14 DIAGNOSIS — E78.5 HYPERLIPIDEMIA LDL GOAL <70: ICD-10-CM

## 2025-03-14 DIAGNOSIS — Z23 IMMUNIZATION DUE: ICD-10-CM

## 2025-03-14 DIAGNOSIS — I10 PRIMARY HYPERTENSION: ICD-10-CM

## 2025-03-14 DIAGNOSIS — F10.21 ALCOHOLISM IN RECOVERY: ICD-10-CM

## 2025-03-14 DIAGNOSIS — J30.2 SEASONAL ALLERGIES: ICD-10-CM

## 2025-03-14 DIAGNOSIS — Z87.891 FORMER TOBACCO USE: ICD-10-CM

## 2025-03-14 DIAGNOSIS — Z00.00 ANNUAL PHYSICAL EXAM: ICD-10-CM

## 2025-03-14 DIAGNOSIS — Z12.11 COLON CANCER SCREENING: ICD-10-CM

## 2025-03-14 DIAGNOSIS — Z79.82 CURRENT USE OF ASPIRIN: ICD-10-CM

## 2025-03-14 DIAGNOSIS — Z00.00 ANNUAL PHYSICAL EXAM: Primary | ICD-10-CM

## 2025-03-14 DIAGNOSIS — I87.2 VENOUS INSUFFICIENCY OF BOTH LOWER EXTREMITIES: ICD-10-CM

## 2025-03-14 LAB
ALBUMIN SERPL-MCNC: 4.4 G/DL (ref 3.5–5.2)
ALBUMIN/GLOB SERPL: 1.4 G/DL
ALP SERPL-CCNC: 66 U/L (ref 39–117)
ALT SERPL W P-5'-P-CCNC: 15 U/L (ref 1–41)
ANION GAP SERPL CALCULATED.3IONS-SCNC: 11.3 MMOL/L (ref 5–15)
AST SERPL-CCNC: 21 U/L (ref 1–40)
BILIRUB SERPL-MCNC: 0.4 MG/DL (ref 0–1.2)
BUN SERPL-MCNC: 17 MG/DL (ref 8–23)
BUN/CREAT SERPL: 17.7 (ref 7–25)
CALCIUM SPEC-SCNC: 9.3 MG/DL (ref 8.6–10.5)
CHLORIDE SERPL-SCNC: 100 MMOL/L (ref 98–107)
CHOLEST SERPL-MCNC: 206 MG/DL (ref 0–200)
CO2 SERPL-SCNC: 25.7 MMOL/L (ref 22–29)
CREAT SERPL-MCNC: 0.96 MG/DL (ref 0.76–1.27)
DEPRECATED RDW RBC AUTO: 42.2 FL (ref 37–54)
EGFRCR SERPLBLD CKD-EPI 2021: 89.9 ML/MIN/1.73
ERYTHROCYTE [DISTWIDTH] IN BLOOD BY AUTOMATED COUNT: 13.1 % (ref 12.3–15.4)
GLOBULIN UR ELPH-MCNC: 3.2 GM/DL
GLUCOSE SERPL-MCNC: 182 MG/DL (ref 65–99)
HBA1C MFR BLD: 8.6 % (ref 4.8–5.6)
HCT VFR BLD AUTO: 46.3 % (ref 37.5–51)
HDLC SERPL-MCNC: 51 MG/DL (ref 40–60)
HGB BLD-MCNC: 15.2 G/DL (ref 13–17.7)
LDLC SERPL CALC-MCNC: 121 MG/DL (ref 0–100)
LDLC/HDLC SERPL: 2.29 {RATIO}
MCH RBC QN AUTO: 28.9 PG (ref 26.6–33)
MCHC RBC AUTO-ENTMCNC: 32.8 G/DL (ref 31.5–35.7)
MCV RBC AUTO: 88 FL (ref 79–97)
PLATELET # BLD AUTO: 221 10*3/MM3 (ref 140–450)
PMV BLD AUTO: 10.4 FL (ref 6–12)
POTASSIUM SERPL-SCNC: 4.6 MMOL/L (ref 3.5–5.2)
PROT SERPL-MCNC: 7.6 G/DL (ref 6–8.5)
RBC # BLD AUTO: 5.26 10*6/MM3 (ref 4.14–5.8)
SODIUM SERPL-SCNC: 137 MMOL/L (ref 136–145)
TRIGL SERPL-MCNC: 191 MG/DL (ref 0–150)
TSH SERPL DL<=0.05 MIU/L-ACNC: 1.63 UIU/ML (ref 0.27–4.2)
VLDLC SERPL-MCNC: 34 MG/DL (ref 5–40)
WBC NRBC COR # BLD AUTO: 7.14 10*3/MM3 (ref 3.4–10.8)

## 2025-03-14 PROCEDURE — 83036 HEMOGLOBIN GLYCOSYLATED A1C: CPT

## 2025-03-14 PROCEDURE — 80050 GENERAL HEALTH PANEL: CPT

## 2025-03-14 PROCEDURE — 80061 LIPID PANEL: CPT

## 2025-03-14 RX ORDER — ASPIRIN 81 MG/1
81 TABLET ORAL DAILY
Qty: 90 TABLET | Refills: 3 | Status: SHIPPED | OUTPATIENT
Start: 2025-03-14

## 2025-03-14 RX ORDER — LISINOPRIL 10 MG/1
10 TABLET ORAL DAILY
Qty: 90 TABLET | Refills: 3 | Status: SHIPPED | OUTPATIENT
Start: 2025-03-14

## 2025-03-14 RX ORDER — LORATADINE 10 MG/1
10 TABLET ORAL DAILY
Qty: 90 TABLET | Refills: 3 | Status: SHIPPED | OUTPATIENT
Start: 2025-03-14

## 2025-03-14 RX ORDER — ATORVASTATIN CALCIUM 40 MG/1
40 TABLET, FILM COATED ORAL DAILY
Qty: 90 TABLET | Refills: 3 | Status: SHIPPED | OUTPATIENT
Start: 2025-03-14

## 2025-03-14 NOTE — PROGRESS NOTES
Physical Exam     Patient Name: Prosper Gonzalez  : 1965   MRN: [id number]   Care Team: Patient Care Team:  Moriah Calderon DO as PCP - General (Internal Medicine)    Chief Complaint:    Chief Complaint   Patient presents with    Annual Exam       History of Present Illness: Prosper Gonzalez is a 59 y.o. male with HTN, T2DM, HLD, alcoholism in recovery who is presents today for annual healthcare maintenance. He is feeling well today. No acute complaints. Living in sober living home.     Depression: PHQ-2 Depression Screening  PHQ-2 Depression Screening  Little interest or pleasure in doing things? Not at all   Feeling down, depressed, or hopeless? Not at all   PHQ-2 Total Score 0      He reports checking his BP frequently at home and readings are normotensive.   He has made significant dietary changes and eating much healthier, low carb     Health Maintenance   Topic Date Due    DIABETIC EYE EXAM  Never done    COLORECTAL CANCER SCREENING  Never done    ZOSTER VACCINE (1 of 2) Never done    DIABETIC FOOT EXAM  2025    HEMOGLOBIN A1C  2025    COVID-19 Vaccine (2024- season) 2025 (Originally 2024)    LIPID PANEL  2025    URINE MICROALBUMIN-CREATININE RATIO (uACR)  2025    BMI FOLLOWUP  2025    ANNUAL PHYSICAL  2026    TDAP/TD VACCINES (2 - Td or Tdap) 2028    HEPATITIS C SCREENING  Completed    INFLUENZA VACCINE  Completed    Pneumococcal Vaccine 50+  Completed       Subjective      Review of Systems:   Review of Systems - See HPI    Past Medical History:   Past Medical History:   Diagnosis Date    Asthma     Depression     Diabetes mellitus     Hyperlipidemia     Hypertension     Kidney stone     Wrist fracture, left     surgery to repair       Past Surgical History:   Past Surgical History:   Procedure Laterality Date    ANKLE SURGERY Right     3 plates    HERNIA REPAIR      UMBILICAL- done in the     INGUINAL HERNIA REPAIR Bilateral 2023     Procedure: INGUINAL HERNIA BILATERAL REPAIR WITH MESH LAPAROSCOPIC WITH DAVINCI ROBOT;  Surgeon: Andrew Valerio MD;  Location: [facility];  Service: Robotics - DaVinci;  Laterality: Bilateral;    LEG SURGERY Left 2018    ISABELLA AND 3 PLATES       Family History: History reviewed. No pertinent family history.    Social History:   Social History     Socioeconomic History    Marital status:    Tobacco Use    Smoking status: Former     Current packs/day: 0.00     Average packs/day: 1 pack/day for 34.0 years (34.0 ttl pk-yrs)     Types: Cigarettes     Start date: 1973     Quit date: 2007     Years since quittin.2    Smokeless tobacco: Never   Vaping Use    Vaping status: Never Used   Substance and Sexual Activity    Alcohol use: Not Currently     Comment: QUIT 4.5YEARS AGO    Drug use: Not Currently    Sexual activity: Defer       Tobacco History:   Social History     Tobacco Use   Smoking Status Former    Current packs/day: 0.00    Average packs/day: 1 pack/day for 34.0 years (34.0 ttl pk-yrs)    Types: Cigarettes    Start date: 1973    Quit date: 2007    Years since quittin.2   Smokeless Tobacco Never       Medications:     Current Outpatient Medications:     acetaminophen (TYLENOL) 500 MG tablet, Take 1 tablet by mouth Every 6 (Six) Hours As Needed for Moderate Pain., Disp: 30 tablet, Rfl: 0    albuterol sulfate  (90 Base) MCG/ACT inhaler, Inhale 2 puffs Every 4 (Four) Hours As Needed for Wheezing., Disp: 18 g, Rfl: 2    aspirin (Aspirin Adult Low Strength) 81 MG EC tablet, Take 1 tablet by mouth Daily., Disp: 90 tablet, Rfl: 3    atorvastatin (Lipitor) 40 MG tablet, Take 1 tablet by mouth Daily., Disp: 90 tablet, Rfl: 3    dicyclomine (BENTYL) 10 MG capsule, , Disp: , Rfl:     empagliflozin (Jardiance) 25 MG tablet tablet, Take 1 tablet by mouth Daily., Disp: 90 tablet, Rfl: 3    fluticasone (FLONASE) 50 MCG/ACT nasal spray, 2 sprays into the nostril(s) as directed by  "provider Daily As Needed for Rhinitis., Disp: 16 g, Rfl: 0    furosemide (LASIX) 20 MG tablet, Take 1-2 tablets by mouth Daily As Needed (lower extremity swelling)., Disp: 60 tablet, Rfl: 3    glucose blood test strip, 1 each by Other route Daily. Use daily as directed, Disp: 100 each, Rfl: 12    glucose monitor monitoring kit, 1 each Daily. Use daily as directed., Disp: 1 each, Rfl: 0    hydrOXYzine pamoate (VISTARIL) 25 MG capsule, , Disp: , Rfl:     ibuprofen (ADVIL,MOTRIN) 400 MG tablet, , Disp: , Rfl:     Lancets Thin misc, Use 1 each Daily. Use daily as directed, Disp: 100 each, Rfl: 11    lisinopril (PRINIVIL,ZESTRIL) 10 MG tablet, Take 1 tablet by mouth Daily., Disp: 90 tablet, Rfl: 3    loratadine (Claritin) 10 MG tablet, Take 1 tablet by mouth Daily., Disp: 90 tablet, Rfl: 3    metFORMIN (GLUCOPHAGE) 1000 MG tablet, Take 1 tablet by mouth 2 (Two) Times a Day With Meals., Disp: 60 tablet, Rfl: 5    naloxone (NARCAN) 4 MG/0.1ML nasal spray, , Disp: , Rfl:     olopatadine (PATADAY) 0.2 % solution ophthalmic solution, Administer 1 drop to both eyes Daily., Disp: 2.5 mL, Rfl: 0    ondansetron ODT (ZOFRAN-ODT) 4 MG disintegrating tablet, , Disp: , Rfl:     potassium chloride (KLOR-CON M20) 20 MEQ CR tablet, Take 1 tablet by mouth Daily. As needed. Take on days when taking Lasix., Disp: 30 tablet, Rfl: 5    Zoster Vac Recomb Adjuvanted 50 MCG/0.5ML reconstituted suspension, Inject 0.5 mL into the appropriate muscle as directed by prescriber 1 (One) Time for 1 dose., Disp: 1 each, Rfl: 0    Allergies:   No Known Allergies    Past Medical History, Social History, Family History and Care Team were all reviewed with patient and updated as appropriate.       Objective     Physical Exam:  Vital Signs:   Vitals:    03/14/25 1059   BP: 138/88   Pulse: 95   SpO2: 98%   Weight: 85.7 kg (189 lb)   Height: 170.2 cm (67\")     Body mass index is 29.6 kg/m².     Physical Exam  Vitals reviewed.   Constitutional:       " Appearance: Normal appearance. He is not ill-appearing.   Cardiovascular:      Rate and Rhythm: Normal rate and regular rhythm.      Heart sounds: Normal heart sounds. No murmur heard.  Pulmonary:      Effort: Pulmonary effort is normal. No respiratory distress.      Breath sounds: Normal breath sounds. No wheezing.   Abdominal:      General: Abdomen is flat. There is no distension.      Palpations: Abdomen is soft.   Skin:     General: Skin is warm and dry.   Neurological:      Mental Status: He is alert.   Psychiatric:         Mood and Affect: Mood normal.         Behavior: Behavior normal.         Judgment: Judgment normal.         Assessment / Plan      Assessment/Plan:   Problems Addressed This Visit  Diagnoses and all orders for this visit:    1. Annual physical exam (Primary)  -     CBC (No Diff); Future  -     Lipid Panel; Future  -     Hemoglobin A1c; Future  -     Comprehensive Metabolic Panel; Future  -     TSH; Future    2. Hyperlipidemia LDL goal <70  -     CBC (No Diff); Future  -     Lipid Panel; Future  -     Hemoglobin A1c; Future  -     Comprehensive Metabolic Panel; Future  -     TSH; Future    3. Primary hypertension  -     CBC (No Diff); Future  -     Lipid Panel; Future  -     Hemoglobin A1c; Future  -     Comprehensive Metabolic Panel; Future  -     TSH; Future  -     lisinopril (PRINIVIL,ZESTRIL) 10 MG tablet; Take 1 tablet by mouth Daily.  Dispense: 90 tablet; Refill: 3  -     atorvastatin (Lipitor) 40 MG tablet; Take 1 tablet by mouth Daily.  Dispense: 90 tablet; Refill: 3    4. Type 2 diabetes mellitus with hyperglycemia, without long-term current use of insulin  -     CBC (No Diff); Future  -     Lipid Panel; Future  -     Hemoglobin A1c; Future  -     Comprehensive Metabolic Panel; Future  -     TSH; Future    5. Venous insufficiency of both lower extremities    6. Alcoholism in recovery  -     CBC (No Diff); Future  -     Lipid Panel; Future  -     Hemoglobin A1c; Future  -      Comprehensive Metabolic Panel; Future  -     TSH; Future    7. Former tobacco use    8. Current use of aspirin  -     aspirin (Aspirin Adult Low Strength) 81 MG EC tablet; Take 1 tablet by mouth Daily.  Dispense: 90 tablet; Refill: 3    9. Seasonal allergies  -     loratadine (Claritin) 10 MG tablet; Take 1 tablet by mouth Daily.  Dispense: 90 tablet; Refill: 3    10. Colon cancer screening  -     Cologuard - Stool, Per Rectum; Future    11. Immunization due  -     Zoster Vac Recomb Adjuvanted 50 MCG/0.5ML reconstituted suspension; Inject 0.5 mL into the appropriate muscle as directed by prescriber 1 (One) Time for 1 dose.  Dispense: 1 each; Refill: 0        Healthcare Maintenance   Vaccines:  Encouraged covid booster and shingrix at pharmacy     Cancer Screening  -Colon cancer screening due - cologuard sent  -Lung cancer screening not indicated 34 pack years, quit >15 years ago  -PSA UTD 5/2024    Other  -PHQ score 0  -Counseled on importance of maintaining healthy diet and routine exercise. Encouraged 150 min exercise per week.  -Tobacco cessation: former smoker, quit >15 years ago  -Blood pressure is at goal at home <130/80, slightly elevated in office today  -Metabolic screening today     Encouraged routine eye and dental exams.     T2DM - uncontrolled A1c due today. Recommend strict compliance with metformin 1000mg BID, Jardiance 25mg daily. Discussed alternative options and patient is not interested in injectable options at this time. Continue dietary compliance.   Continue monitoring glucose TID. Continue statin, lipid panel today. Goal LDL <70. Continue ACEi  Eye exam completed 2024 - encouraged him to schedule for 2025 ([facility])  Follows with podiatry for foot exam - agree with diabetic shoes   Urine micro 11/2024     HTN - Continue 10mg daily.     Alcoholism - congratulated on continued sobriety after recent relapse. Continue with sober living program - [facility].        Plan of care reviewed with  patient at the conclusion of today's visit. Education was provided regarding diagnosis and management.  Patient verbalizes understanding of and agreement with management plan.    Follow Up:   Return in about 3 months (around 6/14/2025) for Recheck.        DO DENVER Dickens RD  [facility]  [address] [facility] [address]  Fax [phone]  Phone [phone]

## 2025-03-19 ENCOUNTER — TELEPHONE (OUTPATIENT)
Dept: FAMILY MEDICINE CLINIC | Facility: CLINIC | Age: 60
End: 2025-03-19
Payer: COMMERCIAL

## 2025-03-19 NOTE — TELEPHONE ENCOUNTER
Still need the most recent office notes from PCP but it needs to be a signed copy of it.    Sent fax 3/14/25 office visit- had to print out and rescan in order to send signed copy    Fax- [phone]

## 2025-03-25 ENCOUNTER — RESULTS FOLLOW-UP (OUTPATIENT)
Dept: FAMILY MEDICINE CLINIC | Facility: CLINIC | Age: 60
End: 2025-03-25
Payer: COMMERCIAL

## 2025-03-25 DIAGNOSIS — I10 PRIMARY HYPERTENSION: ICD-10-CM

## 2025-03-26 RX ORDER — ATORVASTATIN CALCIUM 80 MG/1
80 TABLET, FILM COATED ORAL DAILY
Qty: 90 TABLET | Refills: 3 | Status: SHIPPED | OUTPATIENT
Start: 2025-03-26

## 2025-05-20 NOTE — ASSESSMENT & PLAN NOTE
34 pack years, quit >15 years ago   LEFT VM FOR PATIENT THAT I HAD TO CANCEL HER JUNE FOLLOW UP APPOINTMENT, LET HER KNOW THAT PROVIDER WILL BE BACK ABOUT MID JULY & TO GIVE US A CALL BACK TO RESCHEDULE.

## 2025-06-16 ENCOUNTER — OFFICE VISIT (OUTPATIENT)
Dept: FAMILY MEDICINE CLINIC | Facility: CLINIC | Age: 60
End: 2025-06-16
Payer: COMMERCIAL

## 2025-06-16 VITALS
HEIGHT: 67 IN | SYSTOLIC BLOOD PRESSURE: 122 MMHG | HEART RATE: 97 BPM | WEIGHT: 184.4 LBS | OXYGEN SATURATION: 97 % | DIASTOLIC BLOOD PRESSURE: 70 MMHG | BODY MASS INDEX: 28.94 KG/M2

## 2025-06-16 DIAGNOSIS — I10 PRIMARY HYPERTENSION: ICD-10-CM

## 2025-06-16 DIAGNOSIS — Z87.891 FORMER TOBACCO USE: ICD-10-CM

## 2025-06-16 DIAGNOSIS — F10.21 ALCOHOLISM IN RECOVERY: ICD-10-CM

## 2025-06-16 DIAGNOSIS — I87.2 VENOUS INSUFFICIENCY OF BOTH LOWER EXTREMITIES: ICD-10-CM

## 2025-06-16 DIAGNOSIS — K42.9 UMBILICAL HERNIA WITHOUT OBSTRUCTION AND WITHOUT GANGRENE: ICD-10-CM

## 2025-06-16 DIAGNOSIS — E78.5 HYPERLIPIDEMIA LDL GOAL <70: ICD-10-CM

## 2025-06-16 DIAGNOSIS — E11.65 TYPE 2 DIABETES MELLITUS WITH HYPERGLYCEMIA, WITHOUT LONG-TERM CURRENT USE OF INSULIN: Primary | ICD-10-CM

## 2025-06-16 LAB
EXPIRATION DATE: ABNORMAL
HBA1C MFR BLD: 7.3 % (ref 4.5–5.7)
Lab: ABNORMAL

## 2025-06-16 PROCEDURE — 1126F AMNT PAIN NOTED NONE PRSNT: CPT | Performed by: STUDENT IN AN ORGANIZED HEALTH CARE EDUCATION/TRAINING PROGRAM

## 2025-06-16 PROCEDURE — 3074F SYST BP LT 130 MM HG: CPT | Performed by: STUDENT IN AN ORGANIZED HEALTH CARE EDUCATION/TRAINING PROGRAM

## 2025-06-16 PROCEDURE — 83036 HEMOGLOBIN GLYCOSYLATED A1C: CPT | Performed by: STUDENT IN AN ORGANIZED HEALTH CARE EDUCATION/TRAINING PROGRAM

## 2025-06-16 PROCEDURE — 3078F DIAST BP <80 MM HG: CPT | Performed by: STUDENT IN AN ORGANIZED HEALTH CARE EDUCATION/TRAINING PROGRAM

## 2025-06-16 PROCEDURE — 1160F RVW MEDS BY RX/DR IN RCRD: CPT | Performed by: STUDENT IN AN ORGANIZED HEALTH CARE EDUCATION/TRAINING PROGRAM

## 2025-06-16 PROCEDURE — 3051F HG A1C>EQUAL 7.0%<8.0%: CPT | Performed by: STUDENT IN AN ORGANIZED HEALTH CARE EDUCATION/TRAINING PROGRAM

## 2025-06-16 PROCEDURE — 1159F MED LIST DOCD IN RCRD: CPT | Performed by: STUDENT IN AN ORGANIZED HEALTH CARE EDUCATION/TRAINING PROGRAM

## 2025-06-16 PROCEDURE — 99214 OFFICE O/P EST MOD 30 MIN: CPT | Performed by: STUDENT IN AN ORGANIZED HEALTH CARE EDUCATION/TRAINING PROGRAM

## 2025-06-16 RX ORDER — FUROSEMIDE 20 MG/1
20 TABLET ORAL DAILY PRN
Qty: 60 TABLET | Refills: 3 | Status: SHIPPED | OUTPATIENT
Start: 2025-06-16

## 2025-06-16 RX ORDER — POTASSIUM CHLORIDE 1500 MG/1
20 TABLET, EXTENDED RELEASE ORAL DAILY
Qty: 30 TABLET | Refills: 5 | Status: SHIPPED | OUTPATIENT
Start: 2025-06-16

## 2025-06-16 NOTE — PROGRESS NOTES
Established Office Visit      Patient Name: Prosper Gonzalez  : 1965   MRN: [id number]   Care Team: Patient Care Team:  Moriah Calderon DO as PCP - General (Internal Medicine)    Chief Complaint:    Chief Complaint   Patient presents with    Diabetes    Hyperlipidemia     Takes atorvastatin       History of Present Illness: Prosper Gonzalez is a 60 y.o. male with HTN, T2DM, HLD, alcoholism in recovery who is here today to follow up with chronic medical conditions. He is working at [employer].     He has been working on dietary modifications. Cutting back on processed foods. He is enjoying eating more salads and less carbs. He has been drinking more water and substituting his soda for zero sugar.     He has been checking his BP at home and reports normotensive readings.     He remains sober    He plans to join a gym and begin to exercise     He has an umbilical hernia which causes him discomfort while bearing down. He notices it bigger some days, flat on others.       Subjective      Review of Systems:   Review of Systems - See HPI    I have reviewed and the following portions of the patient's history were updated as appropriate: past family history, past medical history, past social history, past surgical history and problem list.    Medications:     Current Outpatient Medications:     acetaminophen (TYLENOL) 500 MG tablet, Take 1 tablet by mouth Every 6 (Six) Hours As Needed for Moderate Pain., Disp: 30 tablet, Rfl: 0    albuterol sulfate  (90 Base) MCG/ACT inhaler, Inhale 2 puffs Every 4 (Four) Hours As Needed for Wheezing., Disp: 18 g, Rfl: 2    aspirin (Aspirin Adult Low Strength) 81 MG EC tablet, Take 1 tablet by mouth Daily., Disp: 90 tablet, Rfl: 3    atorvastatin (LIPITOR) 80 MG tablet, Take 1 tablet by mouth Daily., Disp: 90 tablet, Rfl: 3    dicyclomine (BENTYL) 10 MG capsule, , Disp: , Rfl:     empagliflozin (Jardiance) 25 MG tablet tablet, Take 1 tablet by mouth Daily.,  "Disp: 90 tablet, Rfl: 3    fluticasone (FLONASE) 50 MCG/ACT nasal spray, 2 sprays into the nostril(s) as directed by provider Daily As Needed for Rhinitis., Disp: 16 g, Rfl: 0    furosemide (LASIX) 20 MG tablet, Take 1 tablet by mouth Daily As Needed (lower extremity swelling)., Disp: 60 tablet, Rfl: 3    glucose blood test strip, 1 each by Other route Daily. Use daily as directed, Disp: 100 each, Rfl: 12    glucose monitor monitoring kit, 1 each Daily. Use daily as directed., Disp: 1 each, Rfl: 0    hydrOXYzine pamoate (VISTARIL) 25 MG capsule, , Disp: , Rfl:     Lancets Thin misc, Use 1 each Daily. Use daily as directed, Disp: 100 each, Rfl: 11    lisinopril (PRINIVIL,ZESTRIL) 10 MG tablet, Take 1 tablet by mouth Daily., Disp: 90 tablet, Rfl: 3    loratadine (Claritin) 10 MG tablet, Take 1 tablet by mouth Daily., Disp: 90 tablet, Rfl: 3    metFORMIN (GLUCOPHAGE) 1000 MG tablet, Take 1 tablet by mouth 2 (Two) Times a Day With Meals., Disp: 60 tablet, Rfl: 5    olopatadine (PATADAY) 0.2 % solution ophthalmic solution, Administer 1 drop to both eyes Daily., Disp: 2.5 mL, Rfl: 0    potassium chloride (KLOR-CON M20) 20 MEQ CR tablet, Take 1 tablet by mouth Daily. As needed. Take on days when taking Lasix., Disp: 30 tablet, Rfl: 5    Allergies:   No Known Allergies    Objective     Physical Exam:  Vital Signs:   Vitals:    06/16/25 1347   BP: 122/70   Pulse: 97   SpO2: 97%   Weight: 83.6 kg (184 lb 6.4 oz)   Height: 170.2 cm (67\")     Body mass index is 28.88 kg/m².     Physical Exam  Vitals reviewed.   Constitutional:       Appearance: Normal appearance. He is not ill-appearing.   Cardiovascular:      Rate and Rhythm: Normal rate.   Pulmonary:      Effort: Pulmonary effort is normal. No respiratory distress.   Abdominal:      Comments: Reducible umbilical hernia, nontender   Skin:     General: Skin is warm and dry.   Neurological:      Mental Status: He is alert.   Psychiatric:         Mood and Affect: Mood normal.    "      Behavior: Behavior normal.         Judgment: Judgment normal.         Assessment / Plan      Assessment/Plan:   Problems Addressed This Visit  Diagnoses and all orders for this visit:    1. Type 2 diabetes mellitus with hyperglycemia, without long-term current use of insulin (Primary)  -     POC Glycosylated Hemoglobin (Hb A1C)  -     metFORMIN (GLUCOPHAGE) 1000 MG tablet; Take 1 tablet by mouth 2 (Two) Times a Day With Meals.  Dispense: 60 tablet; Refill: 5    2. Primary hypertension  -     furosemide (LASIX) 20 MG tablet; Take 1 tablet by mouth Daily As Needed (lower extremity swelling).  Dispense: 60 tablet; Refill: 3    3. Hyperlipidemia LDL goal <70  -     Cancel: Lipid Panel w/ Chol/HDL Ratio; Future    4. Alcoholism in recovery    5. Former tobacco use    6. Umbilical hernia without obstruction and without gangrene  -     Ambulatory Referral to General Surgery    7. Venous insufficiency of both lower extremities  -     furosemide (LASIX) 20 MG tablet; Take 1 tablet by mouth Daily As Needed (lower extremity swelling).  Dispense: 60 tablet; Refill: 3  -     potassium chloride (KLOR-CON M20) 20 MEQ CR tablet; Take 1 tablet by mouth Daily. As needed. Take on days when taking Lasix.  Dispense: 30 tablet; Refill: 5          T2DM - improving, A1c 7.3% which is near goal <7. Recommend strict compliance with metformin 1000mg BID, Jardiance 25mg daily. Continue dietary compliance.   Continue monitoring glucose TID. Continue statin. Goal LDL <70. Continue ACEi  Eye exam completed 2024 - encouraged him to schedule for 2025 ("[employer]")  Follows with podiatry for foot exam - agree with diabetic shoes   Urine micro 11/2024     HTN - Continue 10mg daily.     Alcoholism - congratulated on continued sobriety after recent relapse. Continue with sober living program - [facility] [place].     Umbilical hernia - referred to gen surgery for consultation regarding elective repair       Plan of care reviewed with patient at the  conclusion of today's visit. Education was provided regarding diagnosis and management.  Patient verbalizes understanding of and agreement with management plan.    Follow Up:   Return in about 3 months (around 9/16/2025) for Recheck T2DM .        DO DENVER Dickens RD  [facility]  [address]  [facility] [address]  Fax [phone]  Phone [phone]

## 2025-07-08 ENCOUNTER — TELEPHONE (OUTPATIENT)
Dept: FAMILY MEDICINE CLINIC | Facility: CLINIC | Age: 60
End: 2025-07-08

## 2025-08-06 ENCOUNTER — PRE-ADMISSION TESTING (OUTPATIENT)
Dept: PREADMISSION TESTING | Facility: HOSPITAL | Age: 60
End: 2025-08-06
Payer: COMMERCIAL

## 2025-08-06 VITALS — HEIGHT: 67 IN | BODY MASS INDEX: 28.55 KG/M2 | WEIGHT: 181.88 LBS

## 2025-08-06 LAB
ANION GAP SERPL CALCULATED.3IONS-SCNC: 9.8 MMOL/L (ref 5–15)
BUN SERPL-MCNC: 15.8 MG/DL (ref 8–23)
BUN/CREAT SERPL: 14.6 (ref 7–25)
CALCIUM SPEC-SCNC: 9.6 MG/DL (ref 8.6–10.5)
CHLORIDE SERPL-SCNC: 101 MMOL/L (ref 98–107)
CO2 SERPL-SCNC: 26.2 MMOL/L (ref 22–29)
CREAT SERPL-MCNC: 1.08 MG/DL (ref 0.76–1.27)
DEPRECATED RDW RBC AUTO: 41 FL (ref 37–54)
EGFRCR SERPLBLD CKD-EPI 2021: 77.6 ML/MIN/1.73
ERYTHROCYTE [DISTWIDTH] IN BLOOD BY AUTOMATED COUNT: 12.9 % (ref 12.3–15.4)
GLUCOSE SERPL-MCNC: 138 MG/DL (ref 65–99)
HCT VFR BLD AUTO: 42.4 % (ref 37.5–51)
HGB BLD-MCNC: 14.3 G/DL (ref 13–17.7)
MCH RBC QN AUTO: 29.6 PG (ref 26.6–33)
MCHC RBC AUTO-ENTMCNC: 33.7 G/DL (ref 31.5–35.7)
MCV RBC AUTO: 87.8 FL (ref 79–97)
PLATELET # BLD AUTO: 217 10*3/MM3 (ref 140–450)
PMV BLD AUTO: 9.8 FL (ref 6–12)
POTASSIUM SERPL-SCNC: 4.7 MMOL/L (ref 3.5–5.2)
RBC # BLD AUTO: 4.83 10*6/MM3 (ref 4.14–5.8)
SODIUM SERPL-SCNC: 137 MMOL/L (ref 136–145)
WBC NRBC COR # BLD AUTO: 8.72 10*3/MM3 (ref 3.4–10.8)

## 2025-08-06 PROCEDURE — 80048 BASIC METABOLIC PNL TOTAL CA: CPT

## 2025-08-06 PROCEDURE — 36415 COLL VENOUS BLD VENIPUNCTURE: CPT

## 2025-08-06 PROCEDURE — 85027 COMPLETE CBC AUTOMATED: CPT

## 2025-08-06 PROCEDURE — 93005 ELECTROCARDIOGRAM TRACING: CPT

## 2025-08-08 ENCOUNTER — ANESTHESIA EVENT (OUTPATIENT)
Dept: PERIOP | Facility: HOSPITAL | Age: 60
End: 2025-08-08
Payer: COMMERCIAL

## 2025-08-08 RX ORDER — SODIUM CHLORIDE 0.9 % (FLUSH) 0.9 %
10 SYRINGE (ML) INJECTION EVERY 12 HOURS SCHEDULED
Status: CANCELLED | OUTPATIENT
Start: 2025-08-08

## 2025-08-08 RX ORDER — FAMOTIDINE 10 MG/ML
20 INJECTION, SOLUTION INTRAVENOUS ONCE
Status: CANCELLED | OUTPATIENT
Start: 2025-08-08 | End: 2025-08-08

## 2025-08-08 RX ORDER — SODIUM CHLORIDE 0.9 % (FLUSH) 0.9 %
10 SYRINGE (ML) INJECTION AS NEEDED
Status: CANCELLED | OUTPATIENT
Start: 2025-08-08

## 2025-08-11 ENCOUNTER — ANESTHESIA (OUTPATIENT)
Dept: PERIOP | Facility: HOSPITAL | Age: 60
End: 2025-08-11
Payer: COMMERCIAL

## 2025-08-11 ENCOUNTER — ANESTHESIA EVENT CONVERTED (OUTPATIENT)
Dept: ANESTHESIOLOGY | Facility: HOSPITAL | Age: 60
End: 2025-08-11
Payer: COMMERCIAL

## 2025-08-11 ENCOUNTER — HOSPITAL ENCOUNTER (OUTPATIENT)
Facility: HOSPITAL | Age: 60
Setting detail: HOSPITAL OUTPATIENT SURGERY
Discharge: HOME | End: 2025-08-11
Attending: SURGERY | Admitting: SURGERY
Payer: COMMERCIAL

## 2025-08-11 VITALS
OXYGEN SATURATION: 90 % | TEMPERATURE: 98.7 F | RESPIRATION RATE: 12 BRPM | DIASTOLIC BLOOD PRESSURE: 95 MMHG | HEART RATE: 91 BPM | SYSTOLIC BLOOD PRESSURE: 151 MMHG

## 2025-08-11 LAB
GLUCOSE BLDC GLUCOMTR-MCNC: 122 MG/DL (ref 70–130)
QT INTERVAL: 354 MS
QTC INTERVAL: 423 MS

## 2025-08-11 PROCEDURE — 25010000002 FENTANYL CITRATE (PF) 100 MCG/2ML SOLUTION: Performed by: NURSE ANESTHETIST, CERTIFIED REGISTERED

## 2025-08-11 PROCEDURE — 25010000002 CEFAZOLIN PER 500 MG: Performed by: SURGERY

## 2025-08-11 PROCEDURE — 25810000003 LACTATED RINGERS PER 1000 ML: Performed by: ANESTHESIOLOGY

## 2025-08-11 PROCEDURE — 25010000002 LIDOCAINE PF 1% 1 % SOLUTION: Performed by: NURSE ANESTHETIST, CERTIFIED REGISTERED

## 2025-08-11 PROCEDURE — 25010000002 DEXAMETHASONE SODIUM PHOSPHATE 10 MG/ML SOLUTION: Performed by: NURSE ANESTHETIST, CERTIFIED REGISTERED

## 2025-08-11 PROCEDURE — 25010000002 LIDOCAINE PF 1% 1 % SOLUTION: Performed by: ANESTHESIOLOGY

## 2025-08-11 PROCEDURE — 25010000002 BUPIVACAINE (PF) 0.25 % SOLUTION: Performed by: NURSE ANESTHETIST, CERTIFIED REGISTERED

## 2025-08-11 PROCEDURE — C1781 MESH (IMPLANTABLE): HCPCS | Performed by: SURGERY

## 2025-08-11 PROCEDURE — 82948 REAGENT STRIP/BLOOD GLUCOSE: CPT

## 2025-08-11 PROCEDURE — 25010000002 SUGAMMADEX 200 MG/2ML SOLUTION: Performed by: NURSE ANESTHETIST, CERTIFIED REGISTERED

## 2025-08-11 PROCEDURE — 25010000002 ONDANSETRON PER 1 MG: Performed by: NURSE ANESTHETIST, CERTIFIED REGISTERED

## 2025-08-11 PROCEDURE — 25010000002 HYDROMORPHONE 1 MG/ML SOLUTION

## 2025-08-11 PROCEDURE — 25010000002 PROPOFOL 10 MG/ML EMULSION: Performed by: NURSE ANESTHETIST, CERTIFIED REGISTERED

## 2025-08-11 DEVICE — DEV WND/CLS TISS STRATAFIX SPIRALPDS CT2 PLS ABS 2/0 23CM: Type: IMPLANTABLE DEVICE | Site: ABDOMEN | Status: FUNCTIONAL

## 2025-08-11 DEVICE — SEAL HEMO SURG ARISTA/AH ABS/PWDR 3GM: Type: IMPLANTABLE DEVICE | Site: ABDOMEN | Status: FUNCTIONAL

## 2025-08-11 DEVICE — DEV CONTRL TISS STRATAFIX SPIRAL PDS PLS CT1 2-0 45CM: Type: IMPLANTABLE DEVICE | Site: ABDOMEN | Status: FUNCTIONAL

## 2025-08-11 DEVICE — DEV WND/CLS TISS STRATAFIX ABS SYMM PDS PLS CT2 SZ1 15CM VIL: Type: IMPLANTABLE DEVICE | Site: ABDOMEN | Status: FUNCTIONAL

## 2025-08-11 DEVICE — VENTRALIGHT ST MESH WITH ECHO 2 POSITIONING SYSTEM, 4.5" (11 CM), CIRCLE
Type: IMPLANTABLE DEVICE | Site: ABDOMEN | Status: FUNCTIONAL
Brand: VENTRALIGHT

## 2025-08-11 RX ORDER — FAMOTIDINE 20 MG/1
20 TABLET, FILM COATED ORAL ONCE
Status: COMPLETED | OUTPATIENT
Start: 2025-08-11 | End: 2025-08-11

## 2025-08-11 RX ORDER — NALOXONE HCL 0.4 MG/ML
0.4 VIAL (ML) INJECTION AS NEEDED
Status: DISCONTINUED | OUTPATIENT
Start: 2025-08-11 | End: 2025-08-11 | Stop reason: HOSPADM

## 2025-08-11 RX ORDER — HYDRALAZINE HYDROCHLORIDE 20 MG/ML
5 INJECTION INTRAMUSCULAR; INTRAVENOUS
Status: DISCONTINUED | OUTPATIENT
Start: 2025-08-11 | End: 2025-08-11 | Stop reason: HOSPADM

## 2025-08-11 RX ORDER — ONDANSETRON 4 MG/1
4 TABLET, FILM COATED ORAL EVERY 8 HOURS PRN
Qty: 12 TABLET | Refills: 0 | Status: SHIPPED | OUTPATIENT
Start: 2025-08-11 | End: 2026-08-11

## 2025-08-11 RX ORDER — PROMETHAZINE HYDROCHLORIDE 25 MG/1
25 TABLET ORAL ONCE AS NEEDED
Status: DISCONTINUED | OUTPATIENT
Start: 2025-08-11 | End: 2025-08-11 | Stop reason: HOSPADM

## 2025-08-11 RX ORDER — SODIUM CHLORIDE 9 MG/ML
9 INJECTION, SOLUTION INTRAVENOUS AS NEEDED
Status: DISCONTINUED | OUTPATIENT
Start: 2025-08-11 | End: 2025-08-11 | Stop reason: HOSPADM

## 2025-08-11 RX ORDER — SODIUM CHLORIDE 0.9 % (FLUSH) 0.9 %
3 SYRINGE (ML) INJECTION EVERY 12 HOURS SCHEDULED
Status: DISCONTINUED | OUTPATIENT
Start: 2025-08-11 | End: 2025-08-11 | Stop reason: HOSPADM

## 2025-08-11 RX ORDER — PROMETHAZINE HYDROCHLORIDE 25 MG/1
25 SUPPOSITORY RECTAL ONCE AS NEEDED
Status: DISCONTINUED | OUTPATIENT
Start: 2025-08-11 | End: 2025-08-11 | Stop reason: HOSPADM

## 2025-08-11 RX ORDER — LIDOCAINE HYDROCHLORIDE 10 MG/ML
0.5 INJECTION, SOLUTION EPIDURAL; INFILTRATION; INTRACAUDAL; PERINEURAL ONCE AS NEEDED
Status: COMPLETED | OUTPATIENT
Start: 2025-08-11 | End: 2025-08-11

## 2025-08-11 RX ORDER — HYDROMORPHONE HYDROCHLORIDE 1 MG/ML
0.5 INJECTION, SOLUTION INTRAMUSCULAR; INTRAVENOUS; SUBCUTANEOUS
Status: DISCONTINUED | OUTPATIENT
Start: 2025-08-11 | End: 2025-08-11 | Stop reason: HOSPADM

## 2025-08-11 RX ORDER — HYDROCODONE BITARTRATE AND ACETAMINOPHEN 7.5; 325 MG/1; MG/1
1 TABLET ORAL EVERY 4 HOURS PRN
Status: DISCONTINUED | OUTPATIENT
Start: 2025-08-11 | End: 2025-08-11 | Stop reason: HOSPADM

## 2025-08-11 RX ORDER — ROCURONIUM BROMIDE 10 MG/ML
INJECTION, SOLUTION INTRAVENOUS AS NEEDED
Status: DISCONTINUED | OUTPATIENT
Start: 2025-08-11 | End: 2025-08-11 | Stop reason: SURG

## 2025-08-11 RX ORDER — ONDANSETRON 2 MG/ML
INJECTION INTRAMUSCULAR; INTRAVENOUS AS NEEDED
Status: DISCONTINUED | OUTPATIENT
Start: 2025-08-11 | End: 2025-08-11 | Stop reason: SURG

## 2025-08-11 RX ORDER — SODIUM CHLORIDE, SODIUM LACTATE, POTASSIUM CHLORIDE, CALCIUM CHLORIDE 600; 310; 30; 20 MG/100ML; MG/100ML; MG/100ML; MG/100ML
9 INJECTION, SOLUTION INTRAVENOUS CONTINUOUS
Status: DISCONTINUED | OUTPATIENT
Start: 2025-08-11 | End: 2025-08-11 | Stop reason: HOSPADM

## 2025-08-11 RX ORDER — SODIUM CHLORIDE, SODIUM LACTATE, POTASSIUM CHLORIDE, CALCIUM CHLORIDE 600; 310; 30; 20 MG/100ML; MG/100ML; MG/100ML; MG/100ML
9 INJECTION, SOLUTION INTRAVENOUS CONTINUOUS
Status: DISCONTINUED | OUTPATIENT
Start: 2025-08-12 | End: 2025-08-11 | Stop reason: HOSPADM

## 2025-08-11 RX ORDER — LIDOCAINE HYDROCHLORIDE 10 MG/ML
INJECTION, SOLUTION EPIDURAL; INFILTRATION; INTRACAUDAL; PERINEURAL AS NEEDED
Status: DISCONTINUED | OUTPATIENT
Start: 2025-08-11 | End: 2025-08-11 | Stop reason: SURG

## 2025-08-11 RX ORDER — IPRATROPIUM BROMIDE AND ALBUTEROL SULFATE 2.5; .5 MG/3ML; MG/3ML
3 SOLUTION RESPIRATORY (INHALATION) ONCE AS NEEDED
Status: DISCONTINUED | OUTPATIENT
Start: 2025-08-11 | End: 2025-08-11 | Stop reason: HOSPADM

## 2025-08-11 RX ORDER — LABETALOL HYDROCHLORIDE 5 MG/ML
5 INJECTION, SOLUTION INTRAVENOUS
Status: DISCONTINUED | OUTPATIENT
Start: 2025-08-11 | End: 2025-08-11 | Stop reason: HOSPADM

## 2025-08-11 RX ORDER — ONDANSETRON 2 MG/ML
4 INJECTION INTRAMUSCULAR; INTRAVENOUS ONCE AS NEEDED
Status: DISCONTINUED | OUTPATIENT
Start: 2025-08-11 | End: 2025-08-11 | Stop reason: HOSPADM

## 2025-08-11 RX ORDER — DEXAMETHASONE SODIUM PHOSPHATE 10 MG/ML
INJECTION, SOLUTION INTRAMUSCULAR; INTRAVENOUS AS NEEDED
Status: DISCONTINUED | OUTPATIENT
Start: 2025-08-11 | End: 2025-08-11 | Stop reason: SURG

## 2025-08-11 RX ORDER — MIDAZOLAM HYDROCHLORIDE 1 MG/ML
1 INJECTION, SOLUTION INTRAMUSCULAR; INTRAVENOUS
Status: DISCONTINUED | OUTPATIENT
Start: 2025-08-11 | End: 2025-08-11 | Stop reason: HOSPADM

## 2025-08-11 RX ORDER — DROPERIDOL 2.5 MG/ML
0.62 INJECTION, SOLUTION INTRAMUSCULAR; INTRAVENOUS ONCE AS NEEDED
Status: DISCONTINUED | OUTPATIENT
Start: 2025-08-11 | End: 2025-08-11 | Stop reason: HOSPADM

## 2025-08-11 RX ORDER — FENTANYL CITRATE 50 UG/ML
INJECTION, SOLUTION INTRAMUSCULAR; INTRAVENOUS AS NEEDED
Status: DISCONTINUED | OUTPATIENT
Start: 2025-08-11 | End: 2025-08-11 | Stop reason: SURG

## 2025-08-11 RX ORDER — DROPERIDOL 2.5 MG/ML
0.62 INJECTION, SOLUTION INTRAMUSCULAR; INTRAVENOUS
Status: DISCONTINUED | OUTPATIENT
Start: 2025-08-11 | End: 2025-08-11 | Stop reason: HOSPADM

## 2025-08-11 RX ORDER — SODIUM CHLORIDE 0.9 % (FLUSH) 0.9 %
3-10 SYRINGE (ML) INJECTION AS NEEDED
Status: DISCONTINUED | OUTPATIENT
Start: 2025-08-11 | End: 2025-08-11 | Stop reason: HOSPADM

## 2025-08-11 RX ORDER — HYDROCODONE BITARTRATE AND ACETAMINOPHEN 5; 325 MG/1; MG/1
TABLET ORAL
Status: COMPLETED
Start: 2025-08-11 | End: 2025-08-11

## 2025-08-11 RX ORDER — OXYCODONE AND ACETAMINOPHEN 5; 325 MG/1; MG/1
1 TABLET ORAL EVERY 4 HOURS PRN
Qty: 12 TABLET | Refills: 0 | Status: SHIPPED | OUTPATIENT
Start: 2025-08-11

## 2025-08-11 RX ORDER — FENTANYL CITRATE 50 UG/ML
50 INJECTION, SOLUTION INTRAMUSCULAR; INTRAVENOUS
Status: DISCONTINUED | OUTPATIENT
Start: 2025-08-11 | End: 2025-08-11 | Stop reason: HOSPADM

## 2025-08-11 RX ORDER — BUPIVACAINE HYDROCHLORIDE 2.5 MG/ML
INJECTION, SOLUTION EPIDURAL; INFILTRATION; INTRACAUDAL; PERINEURAL
Status: COMPLETED | OUTPATIENT
Start: 2025-08-11 | End: 2025-08-11

## 2025-08-11 RX ORDER — HYDROCODONE BITARTRATE AND ACETAMINOPHEN 5; 325 MG/1; MG/1
1 TABLET ORAL ONCE AS NEEDED
Status: DISCONTINUED | OUTPATIENT
Start: 2025-08-11 | End: 2025-08-11 | Stop reason: HOSPADM

## 2025-08-11 RX ORDER — DOCUSATE SODIUM 100 MG/1
100 CAPSULE, LIQUID FILLED ORAL 2 TIMES DAILY
Qty: 30 CAPSULE | Refills: 1 | Status: SHIPPED | OUTPATIENT
Start: 2025-08-11 | End: 2026-08-11

## 2025-08-11 RX ORDER — PROPOFOL 10 MG/ML
VIAL (ML) INTRAVENOUS AS NEEDED
Status: DISCONTINUED | OUTPATIENT
Start: 2025-08-11 | End: 2025-08-11 | Stop reason: SURG

## 2025-08-11 RX ORDER — DEXAMETHASONE SODIUM PHOSPHATE 10 MG/ML
INJECTION, SOLUTION INTRAMUSCULAR; INTRAVENOUS
Status: COMPLETED | OUTPATIENT
Start: 2025-08-11 | End: 2025-08-11

## 2025-08-11 RX ADMIN — MUPIROCIN 1 APPLICATION: 20 OINTMENT TOPICAL at 10:54

## 2025-08-11 RX ADMIN — DEXAMETHASONE SODIUM PHOSPHATE 4 MG: 10 INJECTION, SOLUTION INTRAMUSCULAR; INTRAVENOUS at 12:08

## 2025-08-11 RX ADMIN — HYDROMORPHONE HYDROCHLORIDE 0.5 MG: 1 INJECTION, SOLUTION INTRAMUSCULAR; INTRAVENOUS; SUBCUTANEOUS at 14:27

## 2025-08-11 RX ADMIN — PROPOFOL 200 MG: 10 INJECTION, EMULSION INTRAVENOUS at 12:05

## 2025-08-11 RX ADMIN — FAMOTIDINE 20 MG: 20 TABLET, FILM COATED ORAL at 10:54

## 2025-08-11 RX ADMIN — ROCURONIUM BROMIDE 70 MG: 10 INJECTION INTRAVENOUS at 12:05

## 2025-08-11 RX ADMIN — HYDROMORPHONE HYDROCHLORIDE 0.5 MG: 1 INJECTION, SOLUTION INTRAMUSCULAR; INTRAVENOUS; SUBCUTANEOUS at 14:03

## 2025-08-11 RX ADMIN — FENTANYL CITRATE 100 MCG: 50 INJECTION, SOLUTION INTRAMUSCULAR; INTRAVENOUS at 12:05

## 2025-08-11 RX ADMIN — HYDROCODONE BITARTRATE AND ACETAMINOPHEN 1 TABLET: 5; 325 TABLET ORAL at 14:49

## 2025-08-11 RX ADMIN — BUPIVACAINE HYDROCHLORIDE 60 ML: 2.5 INJECTION, SOLUTION EPIDURAL; INFILTRATION; INTRACAUDAL; PERINEURAL at 12:08

## 2025-08-11 RX ADMIN — SODIUM CHLORIDE, SODIUM LACTATE, POTASSIUM CHLORIDE, CALCIUM CHLORIDE 9 ML/HR: 20; 30; 600; 310 INJECTION, SOLUTION INTRAVENOUS at 10:55

## 2025-08-11 RX ADMIN — HYDROMORPHONE HYDROCHLORIDE 0.5 MG: 1 INJECTION, SOLUTION INTRAMUSCULAR; INTRAVENOUS; SUBCUTANEOUS at 14:15

## 2025-08-11 RX ADMIN — LIDOCAINE HYDROCHLORIDE 0.5 ML: 10 INJECTION, SOLUTION EPIDURAL; INFILTRATION; INTRACAUDAL; PERINEURAL at 10:55

## 2025-08-11 RX ADMIN — LIDOCAINE HYDROCHLORIDE 50 MG: 10 INJECTION, SOLUTION EPIDURAL; INFILTRATION; INTRACAUDAL; PERINEURAL at 12:05

## 2025-08-11 RX ADMIN — ONDANSETRON 4 MG: 2 INJECTION INTRAMUSCULAR; INTRAVENOUS at 13:32

## 2025-08-11 RX ADMIN — DEXAMETHASONE SODIUM PHOSPHATE 6 MG: 10 INJECTION, SOLUTION INTRAMUSCULAR; INTRAVENOUS at 12:22

## 2025-08-11 RX ADMIN — SODIUM CHLORIDE 2000 MG: 900 INJECTION INTRAVENOUS at 12:11

## 2025-08-11 RX ADMIN — SUGAMMADEX 200 MG: 100 INJECTION, SOLUTION INTRAVENOUS at 13:46

## (undated) DEVICE — REDUCER: Brand: ENDOWRIST

## (undated) DEVICE — SUT MNCRYL PLS ANTIB UD 4/0 PS2 18IN

## (undated) DEVICE — SUT VIC 0 TIES 18IN J912G

## (undated) DEVICE — ST TBG CONN PNEUMOCLEAR EVAC SMOKE HEAT/HUMID

## (undated) DEVICE — GOWN,PREVENTION PLUS,XXLARGE,STERILE: Brand: MEDLINE

## (undated) DEVICE — LAPAROVUE VISIBILITY SYSTEM LAPAROSCOPIC SOLUTIONS: Brand: LAPAROVUE

## (undated) DEVICE — GLV SURG SENSICARE PI MIC PF SZ8 LF STRL

## (undated) DEVICE — ARM DRAPE

## (undated) DEVICE — DRAPE,TOP,102X53,STERILE: Brand: MEDLINE

## (undated) DEVICE — BNDR ABD PREMIUM/UNIV 10IN 27TO48IN

## (undated) DEVICE — GLV SURG SENSICARE PI MIC PF SZ8.5 LF STRL

## (undated) DEVICE — PK LAP LASR CHOLE 10

## (undated) DEVICE — ENDOPATH XCEL BLUNT TIP TROCARS WITH SMOOTH SLEEVES: Brand: ENDOPATH XCEL

## (undated) DEVICE — GOWN SURG ORBIS LVL3 2XL STRL

## (undated) DEVICE — CANNULA SEAL

## (undated) DEVICE — Device

## (undated) DEVICE — SUT VIC 0 UR6 27IN VCP603H

## (undated) DEVICE — TROCAR: Brand: KII FIOS FIRST ENTRY

## (undated) DEVICE — BLADELESS OBTURATOR: Brand: WECK VISTA

## (undated) DEVICE — SEAL

## (undated) DEVICE — VIOLET BRAIDED (POLYGLACTIN 910), SYNTHETIC ABSORBABLE SUTURE: Brand: COATED VICRYL

## (undated) DEVICE — ANTIBACTERIAL VIOLET BRAIDED (POLYGLACTIN 910), SYNTHETIC ABSORBABLE SUTURE: Brand: COATED VICRYL

## (undated) DEVICE — APPL HEMO SURG ARISTA/AH/FLEXITIP XL 38CM

## (undated) DEVICE — SNAP KOVER: Brand: UNBRANDED

## (undated) DEVICE — TRENDELENBURG WINGPAD POSITIONING KIT DELUXE - WITHOUT BODY STRAP: Brand: SOULE MEDICAL

## (undated) DEVICE — SOL ANTISTICK CAUTRY ELECTROLUBE LF

## (undated) DEVICE — 1LYRTR 16FR10ML100%SIL UMS SNP: Brand: MEDLINE INDUSTRIES, INC.

## (undated) DEVICE — TIP COVER ACCESSORY

## (undated) DEVICE — COLUMN DRAPE

## (undated) DEVICE — BLANKT WARM UPPR/BDY ARM/OUT 57X196CM

## (undated) DEVICE — PATIENT RETURN ELECTRODE, SINGLE-USE, CONTACT QUALITY MONITORING, ADULT, WITH 9FT CORD, FOR PATIENTS WEIGING OVER 33LBS. (15KG): Brand: MEGADYNE

## (undated) DEVICE — 3M™ IOBAN™ 2 ANTIMICROBIAL INCISE DRAPE 6650EZ: Brand: IOBAN™ 2

## (undated) DEVICE — TOTAL TRAY, 16FR 10ML SIL FOLEY, URN: Brand: MEDLINE

## (undated) DEVICE — NEEDLE, QUINCKE 22GX3.5": Brand: MEDLINE INDUSTRIES, INC.